# Patient Record
Sex: FEMALE | Race: WHITE | NOT HISPANIC OR LATINO | URBAN - METROPOLITAN AREA
[De-identification: names, ages, dates, MRNs, and addresses within clinical notes are randomized per-mention and may not be internally consistent; named-entity substitution may affect disease eponyms.]

---

## 2021-02-04 ENCOUNTER — INPATIENT (INPATIENT)
Facility: HOSPITAL | Age: 86
LOS: 11 days | Discharge: HOME | End: 2021-02-16
Attending: STUDENT IN AN ORGANIZED HEALTH CARE EDUCATION/TRAINING PROGRAM | Admitting: STUDENT IN AN ORGANIZED HEALTH CARE EDUCATION/TRAINING PROGRAM
Payer: MEDICARE

## 2021-02-04 VITALS
HEART RATE: 80 BPM | TEMPERATURE: 98 F | DIASTOLIC BLOOD PRESSURE: 61 MMHG | SYSTOLIC BLOOD PRESSURE: 119 MMHG | OXYGEN SATURATION: 97 % | RESPIRATION RATE: 16 BRPM

## 2021-02-04 DIAGNOSIS — Z98.891 HISTORY OF UTERINE SCAR FROM PREVIOUS SURGERY: Chronic | ICD-10-CM

## 2021-02-04 LAB
ALBUMIN SERPL ELPH-MCNC: 3.7 G/DL — SIGNIFICANT CHANGE UP (ref 3.3–5)
ALP SERPL-CCNC: 187 U/L — HIGH (ref 40–120)
ALT FLD-CCNC: 5 U/L — SIGNIFICANT CHANGE UP (ref 4–33)
ANION GAP SERPL CALC-SCNC: 13 MMOL/L — SIGNIFICANT CHANGE UP (ref 7–14)
AST SERPL-CCNC: 9 U/L — SIGNIFICANT CHANGE UP (ref 4–32)
BILIRUB SERPL-MCNC: 0.4 MG/DL — SIGNIFICANT CHANGE UP (ref 0.2–1.2)
BLOOD GAS VENOUS COMPREHENSIVE RESULT: SIGNIFICANT CHANGE UP
BUN SERPL-MCNC: 16 MG/DL — SIGNIFICANT CHANGE UP (ref 7–23)
CALCIUM SERPL-MCNC: 9.5 MG/DL — SIGNIFICANT CHANGE UP (ref 8.4–10.5)
CHLORIDE SERPL-SCNC: 104 MMOL/L — SIGNIFICANT CHANGE UP (ref 98–107)
CO2 SERPL-SCNC: 24 MMOL/L — SIGNIFICANT CHANGE UP (ref 22–31)
CREAT SERPL-MCNC: 1.07 MG/DL — SIGNIFICANT CHANGE UP (ref 0.5–1.3)
CRP SERPL-MCNC: 54.8 MG/L — HIGH
ERYTHROCYTE [SEDIMENTATION RATE] IN BLOOD: 29 MM/HR — HIGH (ref 4–25)
GLUCOSE SERPL-MCNC: 107 MG/DL — HIGH (ref 70–99)
HCT VFR BLD CALC: 39.1 % — SIGNIFICANT CHANGE UP (ref 34.5–45)
HGB BLD-MCNC: 12 G/DL — SIGNIFICANT CHANGE UP (ref 11.5–15.5)
IANC: 55.5 K/UL — HIGH (ref 1.5–8.5)
MCHC RBC-ENTMCNC: 27.9 PG — SIGNIFICANT CHANGE UP (ref 27–34)
MCHC RBC-ENTMCNC: 30.7 GM/DL — LOW (ref 32–36)
MCV RBC AUTO: 90.9 FL — SIGNIFICANT CHANGE UP (ref 80–100)
PLATELET # BLD AUTO: 246 K/UL — SIGNIFICANT CHANGE UP (ref 150–400)
POTASSIUM SERPL-MCNC: 3.8 MMOL/L — SIGNIFICANT CHANGE UP (ref 3.5–5.3)
POTASSIUM SERPL-SCNC: 3.8 MMOL/L — SIGNIFICANT CHANGE UP (ref 3.5–5.3)
PROT SERPL-MCNC: 6.1 G/DL — SIGNIFICANT CHANGE UP (ref 6–8.3)
RBC # BLD: 4.3 M/UL — SIGNIFICANT CHANGE UP (ref 3.8–5.2)
RBC # FLD: 13.5 % — SIGNIFICANT CHANGE UP (ref 10.3–14.5)
SODIUM SERPL-SCNC: 141 MMOL/L — SIGNIFICANT CHANGE UP (ref 135–145)
WBC # BLD: 110.56 K/UL — CRITICAL HIGH (ref 3.8–10.5)
WBC # FLD AUTO: 110.56 K/UL — CRITICAL HIGH (ref 3.8–10.5)

## 2021-02-04 PROCEDURE — 73110 X-RAY EXAM OF WRIST: CPT | Mod: 26,LT

## 2021-02-04 PROCEDURE — 73590 X-RAY EXAM OF LOWER LEG: CPT | Mod: 26,LT

## 2021-02-04 NOTE — ED ADULT NURSE NOTE - CHIEF COMPLAINT QUOTE
Pt brought in by EMS from home c/o increasing growth/necrosis on L. cheek. Pt with growth, necrosis on L. wrist and L. ankle. Denies fever, chills, difficulty breathing. Per EMS, growth appeared last summer after completing treatment for shingles. Per EMS, pt with unknown medical hx.

## 2021-02-04 NOTE — ED ADULT TRIAGE NOTE - CHIEF COMPLAINT QUOTE
Pt brought in by EMS from home c/o increasing growth on L. cheek. Pt with growth, necrosis on L. wrist and L. ankle. Denies fever, chills, difficulty breathing. Per EMS, growth appeared last summer after completing for shingles. Per EMS, pt with unknown medical hx. Pt brought in by EMS from home c/o increasing growth on L. cheek. Pt with growth, necrosis on L. wrist and L. ankle. Denies fever, chills, difficulty breathing. Per EMS, growth appeared last summer after completing treatment for shingles. Per EMS, pt with unknown medical hx. Pt brought in by EMS from home c/o increasing growth/necrosis on L. cheek. Pt with growth, necrosis on L. wrist and L. ankle. Denies fever, chills, difficulty breathing. Per EMS, growth appeared last summer after completing treatment for shingles. Per EMS, pt with unknown medical hx.

## 2021-02-04 NOTE — ED PROVIDER NOTE - CLINICAL SUMMARY MEDICAL DECISION MAKING FREE TEXT BOX
Vasu Dominguez MD. 89 F with no known PMHx presents for a fungating mass on L cheek for 3 years; similar lesion on L wrist. there is skin breakdown with dry gangrene on L shin as well. Pt states she presents today because "I'm desperate". Has not seen a doctor for these lesions. Exam as above. Pt unlikely to be able to care for herself at home, although she states she lives w/ her daughter. Will need CT to see extent of these masses, as well as a metastatic w/u. Will need admission.

## 2021-02-04 NOTE — ED PROVIDER NOTE - NS ED ROS FT
Constitutional: no fevers; no chills  HEENT: no visual changes, no sore throat, no rhinorrhea  CV: no cp; no palpitations  Resp: no sob; no cough  GI: no abd pain, no nausea, no vomiting, no diarrhea, no constipation  : no dysuria, no hematuria  MSK: no myalgais; no arthralgias  skin: Fungating mass   neuro: no HA, no numbness; no weakness, no tingling  ROS statement: all other ROS negative except as per HPI

## 2021-02-04 NOTE — ED PROVIDER NOTE - ATTENDING CONTRIBUTION TO CARE
agree with above hpi  on my exam  vital signs: T(C): 36.4 (02-04-21 @ 20:19), Max: 36.7 (02-04-21 @ 16:40)  HR: 78 (02-04-21 @ 20:19) (78 - 80)  BP: 124/54 (02-04-21 @ 20:19) (119/61 - 124/60)  BP(mean): --  RR: 15 (02-04-21 @ 20:19) (15 - 16)  SpO2: 98% (02-04-21 @ 20:19) (97% - 99%)  GEN - NAD; disheveled appearing, ao3  HEAD - NC/AT   EYES- PERRL, EOMI  ENT: Airway patent, mmm, no intraoral lesions or invasion. +large irregularly shaped fungating/necrotic mass to entirety of left cheek, malodorous, no bleeding, no surrounding induration/warmth/erythema. NECK: Neck supple, non-tender  PULMONARY - CTA b/l, symmetric breath sounds.   CARDIAC -s1s2, RRR, no M,G,R  ABDOMEN - +BS, ND, NT, soft, +suprapubic firm mass, nontender, no overlying skin change  BACK - no CVA tenderness, Normal  spine   EXTREMITIES - FROM to all joints. Left forearm with necrotic appearing mass, friable mucosa, left shin with necrotic skin flap, underlying friable and necrotic appearing tissue. No crepitus throughout.  Masses with no surrounding induration/erythema/warmth. No regions of fluctuance. No edema   SKIN - thin skin, mass findings as above  NEUROLOGIC - alert, speech clear, no focal deficits  PSYCH - calm, cooperative in ed.  Patient presents to the ed with very large fungating mass to left cheek which has been present and worsening for 3 years, mass to left forearm which has been present for months, and necrotic skin avulsion/wound to left shin, chronic abd mass. No fevers, chills, ha, cp, sob, abd pain, vomiting, diarrhea, dysuria. Patient states she has not seen a doctor in at least 15 years and is here for help. Patient can tolerate po without difficulty, facial mass does not appear to invade oropharyngeal mucosa. Appears unkempt. Plan for labs to eval for elec disturbances, organ dysfunction, ct face, c/a/p-further evaluate facial mass as well as evaluate for mets in setting of possible neoplastic cause, will require admission for further w/u. patient denies any previous medical history and does not take any meds at current time.

## 2021-02-04 NOTE — ED ADULT NURSE NOTE - OBJECTIVE STATEMENT
Patient is a 89-year-old, A&OX3, no known past medical Phx presenting to the ED for Left cheek, large necrotizing fungating mass. 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 Left shin noted to have dried gangrene. Pt says "I have not seen a doctor in many years". Protruding hernia noted to pt bottom. Pt unclear of timeline of growths. Pt appears comfortably appearing. Respirations even and unlabored, chest rise equal b/l. 20 G placed in R AC. Labs drawn and sent. Covid sent. Safety being maintained. Bed set in lowest position. Will continue to monitor.

## 2021-02-04 NOTE — ED PROVIDER NOTE - OBJECTIVE STATEMENT
89 yo F with no known pmhx presents to the ED for a left cheek fungating mass that pt states has been present for 3 years. states she has only spoken to a doctor over the phone and has not been evaluated in person for this. Pt also has a similar mass on her L wrist and necrotic L shin, unclear the timeline. Pt states she presents today because "i am desperate". pt denies dysphagia, fever, chills, nausea, vomiting, dysuria, cp, sob, abd pain, diarrhea. Pt states she lives with her daughter. 89 yo F with no known pmhx presents to the ED for a left cheek fungating mass that pt states has been present for 3 years. states she has only spoken to a doctor over the phone and has not been evaluated in person for this. Pt also has a similar mass on her L wrist and necrotic L shin, unclear the timeline. Pt states she presents today because "i am desperate". pt denies dysphagia, fever, chills, nausea, vomiting, dysuria, cp, sob, abd pain, diarrhea. Pt states she lives with her daughter.  Pt does not know her daughter's phone number and no number is listed in the EMR. 89 yo F with no known pmhx presents to the ED for a left cheek fungating mass that pt states has been present for 3 years. states she has only spoken to a doctor over the phone and has not been evaluated in person for this. Pt also has a similar mass on her L wrist and necrotic L shin, unclear the timeline. Pt states she presents today because "i am desperate". pt denies dysphagia, fever, chills, nausea, vomiting, dysuria, cp, sob, abd pain, diarrhea. Pt states she lives with her daughter.  Tania, daughter  Cell - [phone]  [place] - [phone]  Daughter states that she has had these lesions on her face and arm since the summer.

## 2021-02-04 NOTE — ED PROVIDER NOTE - PHYSICAL EXAMINATION
PHYSICAL EXAM:  GENERAL: non-toxic appearing; in no respiratory distress  HEAD Atraumatic, Normocephalic  NECK: No JVD; FROM  EYES: PERRL, EOMs intact b/l w/out deficits  CHEST/LUNG: CTAB no wheezes/rhonchi/rales  HEART: RRR no murmur/gallops/rubs  ABDOMEN: +BS, soft, NT; there is a large inguinal hernia with a mass in her groin region; no overlying skin changes  EXTREMITIES: No LE edema, +2 radial pulses b/l, +2 DP/PT pulses b/l  MUSCULOSKELETAL: FROM of all 4 extremities  NERVOUS SYSTEM:  A&Ox3, No motor deficits or sensory deficits; CNII-XII intact; no focal neurologic deficits  SKIN:  On left cheek, there is a large, necrotic fungating mass; it does NOT appear to invade in the oropharynx; there is a fungating mass on her L wrist; there is skin breakdown on her L shin; no crepitance;

## 2021-02-04 NOTE — ED ADULT NURSE REASSESSMENT NOTE - NS ED NURSE REASSESS COMMENT FT1
Received report from ISABEL Waddell. Pt is A&Ox4, resting comfortably. Breathing even and unlabored. NAD. Waiting for CT scan. Will continue to monitor.

## 2021-02-05 DIAGNOSIS — L98.8 OTHER SPECIFIED DISORDERS OF THE SKIN AND SUBCUTANEOUS TISSUE: ICD-10-CM

## 2021-02-05 DIAGNOSIS — R09.89 OTHER SPECIFIED SYMPTOMS AND SIGNS INVOLVING THE CIRCULATORY AND RESPIRATORY SYSTEMS: ICD-10-CM

## 2021-02-05 DIAGNOSIS — R82.71 BACTERIURIA: ICD-10-CM

## 2021-02-05 DIAGNOSIS — N81.4 UTEROVAGINAL PROLAPSE, UNSPECIFIED: ICD-10-CM

## 2021-02-05 DIAGNOSIS — R22.0 LOCALIZED SWELLING, MASS AND LUMP, HEAD: ICD-10-CM

## 2021-02-05 DIAGNOSIS — D72.829 ELEVATED WHITE BLOOD CELL COUNT, UNSPECIFIED: ICD-10-CM

## 2021-02-05 DIAGNOSIS — Z29.9 ENCOUNTER FOR PROPHYLACTIC MEASURES, UNSPECIFIED: ICD-10-CM

## 2021-02-05 LAB
APPEARANCE UR: ABNORMAL
APTT BLD: 26.8 SEC — LOW (ref 27–36.3)
APTT BLD: 28.7 SEC — SIGNIFICANT CHANGE UP (ref 27–36.3)
BACTERIA # UR AUTO: ABNORMAL
BASOPHILS # BLD AUTO: 0 K/UL — SIGNIFICANT CHANGE UP (ref 0–0.2)
BASOPHILS # BLD AUTO: 0.04 K/UL — SIGNIFICANT CHANGE UP (ref 0–0.2)
BASOPHILS NFR BLD AUTO: 0 % — SIGNIFICANT CHANGE UP (ref 0–2)
BASOPHILS NFR BLD AUTO: 0 % — SIGNIFICANT CHANGE UP (ref 0–2)
BILIRUB UR-MCNC: NEGATIVE — SIGNIFICANT CHANGE UP
BLD GP AB SCN SERPL QL: NEGATIVE — SIGNIFICANT CHANGE UP
COLOR SPEC: YELLOW — SIGNIFICANT CHANGE UP
D DIMER BLD IA.RAPID-MCNC: 159 NG/ML DDU — HIGH
DIFF PNL FLD: ABNORMAL
EOSINOPHIL # BLD AUTO: 0 K/UL — SIGNIFICANT CHANGE UP (ref 0–0.5)
EOSINOPHIL # BLD AUTO: 0.25 K/UL — SIGNIFICANT CHANGE UP (ref 0–0.5)
EOSINOPHIL NFR BLD AUTO: 0 % — SIGNIFICANT CHANGE UP (ref 0–6)
EOSINOPHIL NFR BLD AUTO: 0.3 % — SIGNIFICANT CHANGE UP (ref 0–6)
EPI CELLS # UR: 20 /HPF — HIGH (ref 0–5)
FERRITIN SERPL-MCNC: 292 NG/ML — HIGH (ref 15–150)
FIBRINOGEN PPP-MCNC: 714 MG/DL — HIGH (ref 290–520)
GLUCOSE UR QL: NEGATIVE — SIGNIFICANT CHANGE UP
HCT VFR BLD CALC: 33.4 % — LOW (ref 34.5–45)
HGB BLD-MCNC: 10.2 G/DL — LOW (ref 11.5–15.5)
HYALINE CASTS # UR AUTO: 3 /LPF — SIGNIFICANT CHANGE UP (ref 0–7)
IANC: 50.92 K/UL — HIGH (ref 1.5–8.5)
IMM GRANULOCYTES NFR BLD AUTO: 2.5 % — HIGH (ref 0–1.5)
INR BLD: 1.27 RATIO — HIGH (ref 0.88–1.16)
INR BLD: 1.32 RATIO — HIGH (ref 0.88–1.16)
KETONES UR-MCNC: NEGATIVE — SIGNIFICANT CHANGE UP
LDH SERPL L TO P-CCNC: 114 U/L — LOW (ref 135–225)
LEUKOCYTE ESTERASE UR-ACNC: ABNORMAL
LYMPHOCYTES # BLD AUTO: 44.5 % — HIGH (ref 13–44)
LYMPHOCYTES # BLD AUTO: 44.51 K/UL — HIGH (ref 1–3.3)
LYMPHOCYTES # BLD AUTO: 55 % — HIGH (ref 13–44)
LYMPHOCYTES # BLD AUTO: 60.81 K/UL — HIGH (ref 1–3.3)
MCHC RBC-ENTMCNC: 27.2 PG — SIGNIFICANT CHANGE UP (ref 27–34)
MCHC RBC-ENTMCNC: 30.5 GM/DL — LOW (ref 32–36)
MCV RBC AUTO: 89.1 FL — SIGNIFICANT CHANGE UP (ref 80–100)
MONOCYTES # BLD AUTO: 1.82 K/UL — HIGH (ref 0–0.9)
MONOCYTES # BLD AUTO: 2.21 K/UL — HIGH (ref 0–0.9)
MONOCYTES NFR BLD AUTO: 1.8 % — LOW (ref 2–14)
MONOCYTES NFR BLD AUTO: 2 % — SIGNIFICANT CHANGE UP (ref 2–14)
NEUTROPHILS # BLD AUTO: 47.54 K/UL — HIGH (ref 1.8–7.4)
NEUTROPHILS # BLD AUTO: 50.92 K/UL — HIGH (ref 1.8–7.4)
NEUTROPHILS NFR BLD AUTO: 43 % — SIGNIFICANT CHANGE UP (ref 43–77)
NEUTROPHILS NFR BLD AUTO: 50.9 % — SIGNIFICANT CHANGE UP (ref 43–77)
NITRITE UR-MCNC: NEGATIVE — SIGNIFICANT CHANGE UP
NRBC # BLD: 0 /100 WBCS — SIGNIFICANT CHANGE UP
NRBC # FLD: 0 K/UL — SIGNIFICANT CHANGE UP
PH UR: 6.5 — SIGNIFICANT CHANGE UP (ref 5–8)
PLATELET # BLD AUTO: 205 K/UL — SIGNIFICANT CHANGE UP (ref 150–400)
PROT UR-MCNC: ABNORMAL
PROTHROM AB SERPL-ACNC: 14.4 SEC — HIGH (ref 10.6–13.6)
PROTHROM AB SERPL-ACNC: 14.8 SEC — HIGH (ref 10.6–13.6)
RBC # BLD: 3.75 M/UL — LOW (ref 3.8–5.2)
RBC # FLD: 13.6 % — SIGNIFICANT CHANGE UP (ref 10.3–14.5)
RBC CASTS # UR COMP ASSIST: 2 /HPF — SIGNIFICANT CHANGE UP (ref 0–4)
RH IG SCN BLD-IMP: POSITIVE — SIGNIFICANT CHANGE UP
SARS-COV-2 RNA SPEC QL NAA+PROBE: SIGNIFICANT CHANGE UP
SP GR SPEC: 1.04 — HIGH (ref 1.01–1.02)
URATE SERPL-MCNC: 8.1 MG/DL — HIGH (ref 2.5–7)
URATE SERPL-MCNC: 8.3 MG/DL — HIGH (ref 2.5–7)
UROBILINOGEN FLD QL: SIGNIFICANT CHANGE UP
WBC # BLD: 100 K/UL — CRITICAL HIGH (ref 3.8–10.5)
WBC # FLD AUTO: 100 K/UL — CRITICAL HIGH (ref 3.8–10.5)
WBC UR QL: 185 /HPF — HIGH (ref 0–5)

## 2021-02-05 PROCEDURE — 99285 EMERGENCY DEPT VISIT HI MDM: CPT

## 2021-02-05 PROCEDURE — 99222 1ST HOSP IP/OBS MODERATE 55: CPT

## 2021-02-05 PROCEDURE — 99223 1ST HOSP IP/OBS HIGH 75: CPT | Mod: GC

## 2021-02-05 PROCEDURE — 88189 FLOWCYTOMETRY/READ 16 & >: CPT

## 2021-02-05 PROCEDURE — 71260 CT THORAX DX C+: CPT | Mod: 26

## 2021-02-05 PROCEDURE — 70450 CT HEAD/BRAIN W/O DYE: CPT | Mod: 26

## 2021-02-05 PROCEDURE — 99223 1ST HOSP IP/OBS HIGH 75: CPT | Mod: GC,AI

## 2021-02-05 PROCEDURE — 74177 CT ABD & PELVIS W/CONTRAST: CPT | Mod: 26

## 2021-02-05 PROCEDURE — 70487 CT MAXILLOFACIAL W/DYE: CPT | Mod: 26

## 2021-02-05 RX ORDER — SODIUM CHLORIDE 9 MG/ML
1000 INJECTION, SOLUTION INTRAVENOUS
Refills: 0 | Status: DISCONTINUED | OUTPATIENT
Start: 2021-02-05 | End: 2021-02-06

## 2021-02-05 RX ORDER — ENOXAPARIN SODIUM 100 MG/ML
40 INJECTION SUBCUTANEOUS DAILY
Refills: 0 | Status: DISCONTINUED | OUTPATIENT
Start: 2021-02-05 | End: 2021-02-05

## 2021-02-05 RX ORDER — SODIUM CHLORIDE 9 MG/ML
1000 INJECTION, SOLUTION INTRAVENOUS ONCE
Refills: 0 | Status: COMPLETED | OUTPATIENT
Start: 2021-02-05 | End: 2021-02-05

## 2021-02-05 RX ORDER — VANCOMYCIN HCL 1 G
1000 VIAL (EA) INTRAVENOUS ONCE
Refills: 0 | Status: COMPLETED | OUTPATIENT
Start: 2021-02-05 | End: 2021-02-05

## 2021-02-05 RX ORDER — ENOXAPARIN SODIUM 100 MG/ML
30 INJECTION SUBCUTANEOUS DAILY
Refills: 0 | Status: DISCONTINUED | OUTPATIENT
Start: 2021-02-05 | End: 2021-02-16

## 2021-02-05 RX ORDER — INFLUENZA VIRUS VACCINE 15; 15; 15; 15 UG/.5ML; UG/.5ML; UG/.5ML; UG/.5ML
0.5 SUSPENSION INTRAMUSCULAR ONCE
Refills: 0 | Status: DISCONTINUED | OUTPATIENT
Start: 2021-02-05 | End: 2021-02-16

## 2021-02-05 RX ORDER — PIPERACILLIN AND TAZOBACTAM 4; .5 G/20ML; G/20ML
3.38 INJECTION, POWDER, LYOPHILIZED, FOR SOLUTION INTRAVENOUS ONCE
Refills: 0 | Status: COMPLETED | OUTPATIENT
Start: 2021-02-05 | End: 2021-02-05

## 2021-02-05 RX ADMIN — SODIUM CHLORIDE 1000 MILLILITER(S): 9 INJECTION, SOLUTION INTRAVENOUS at 12:12

## 2021-02-05 RX ADMIN — Medication 100 MILLIGRAM(S): at 04:01

## 2021-02-05 RX ADMIN — Medication 250 MILLIGRAM(S): at 05:45

## 2021-02-05 RX ADMIN — PIPERACILLIN AND TAZOBACTAM 200 GRAM(S): 4; .5 INJECTION, POWDER, LYOPHILIZED, FOR SOLUTION INTRAVENOUS at 05:05

## 2021-02-05 RX ADMIN — ENOXAPARIN SODIUM 30 MILLIGRAM(S): 100 INJECTION SUBCUTANEOUS at 16:06

## 2021-02-05 NOTE — H&P ADULT - PROBLEM SELECTOR PLAN 2
WBC of 110.56 with lymphocytic predominance with elevated CRP, lactate  DDx malignancy (leukemia ie. CLL, lymphoma), less likely infectious  -CT A/P showing splenomegaly, LAD in left axilla   -LDH, uric acid added for TLS eval  -Peripheral Smear  -Coags, T/S  -LN biopsy for diagnosis and staging  -F/u BCx2, UCx  -GOC conversation regarding treatment options, code status  -Heme-Onc consulted

## 2021-02-05 NOTE — H&P ADULT - NSHPPHYSICALEXAM_GEN_ALL_CORE
PHYSICAL EXAM:  GENERAL: NAD, well-groomed, well-developed  HEAD: +Large fungating malodorous mass draining brown serous fluid on localized to left cheek, otherwise normocephalic  EYES: EOMI, PERRLA, conjunctiva and sclera clear  ENMT: No tonsillar erythema, exudates, or enlargement; Moist mucous membranes  NECK: +Erythema of left neck. Supple, No JVD, Normal Thyroid  CHEST/LUNG: Decreased lung sounds on left lower lobe, otherwise clear to auscultation bilaterally; No rales, rhonchi, wheezing, or rubs  HEART: Regular rate and rhythm; S1 and S2; No murmurs, rubs, or gallops  ABDOMEN: +Splenomegaly. Soft, Nontender, Nondistended: Bowel sounds present  EXTREMITIES: 2+ Peripheral Pulses, No clubbing, cyanosis, or edema  LYMPH: +Left axillary LAD  SKIN: +Left shin necrotic lesion, left wrist mass with bandaging c/d/i  MSK: no joint swelling or erythema  NEURO: CN 2-12 intact, 5/5 strength in UE and LE, gross sensation intact, Gait not assessed  PSYCH: AOX4 Vital Signs Last 24 Hrs  T(C): 36.7 (05 Feb 2021 07:00), Max: 36.8 (05 Feb 2021 01:02)  T(F): 98.1 (05 Feb 2021 07:00), Max: 98.3 (05 Feb 2021 01:02)  HR: 75 (05 Feb 2021 07:00) (75 - 80)  BP: 126/68 (05 Feb 2021 07:00) (109/50 - 139/59)  BP(mean): --  RR: 16 (05 Feb 2021 07:00) (15 - 16)  SpO2: 98% (05 Feb 2021 07:00) (97% - 99%)    PHYSICAL EXAM:  GENERAL: NAD, well-groomed, well-developed  HEAD: +Large fungating malodorous soft tisse mass draining brown serous fluid on localized to left cheek, doesn't cross midline, +non-tender, otherwise normocephalic and normal facies  EYES: EOMI, PERRLA, conjunctiva and sclera clear  ENMT: No tonsillar erythema, exudates, or enlargement; Moist mucous membranes  NECK: +Erythema of left neck. Supple, No JVD, Normal Thyroid  CHEST/LUNG: Decreased lung sounds on left lower lobe, otherwise clear to auscultation bilaterally; No rales, rhonchi, wheezing, or rubs  HEART: Regular rate and rhythm; S1 and S2; No murmurs, rubs, or gallops  ABDOMEN: +Splenomegaly. Soft, Nontender, Nondistended: Bowel sounds present  EXTREMITIES: 2+ Peripheral Pulses, No clubbing, cyanosis, or edema  LYMPH: +Left axillary LAD  SKIN: +Left shin necrotic lesion, left wrist mass with bandaging c/d/i  MSK: no joint swelling or erythema  NEURO: CN 2-12 intact, 5/5 strength in UE and LE, gross sensation intact, Gait not assessed  PSYCH: AOX4

## 2021-02-05 NOTE — H&P ADULT - NSHPLABSRESULTS_GEN_ALL_CORE
CBC Full  -  ( 04 Feb 2021 20:40 )  WBC Count : 110.56 K/uL  RBC Count : 4.30 M/uL  Hemoglobin : 12.0 g/dL  Hematocrit : 39.1 %  Platelet Count - Automated : 246 K/uL  Mean Cell Volume : 90.9 fL  Mean Cell Hemoglobin : 27.9 pg  Mean Cell Hemoglobin Concentration : 30.7 gm/dL  Auto Neutrophil # : 47.54 K/uL  Auto Lymphocyte # : 60.81 K/uL  Auto Monocyte # : 2.21 K/uL  Auto Eosinophil # : 0.00 K/uL  Auto Basophil # : 0.00 K/uL  Auto Neutrophil % : 43.0 %  Auto Lymphocyte % : 55.0 %  Auto Monocyte % : 2.0 %  Auto Eosinophil % : 0.0 %  Auto Basophil % : 0.0 %    02-04    141  |  104  |  16  ----------------------------<  107<H>  3.8   |  24  |  1.07    Ca    9.5      04 Feb 2021 20:40    TPro  6.1  /  Alb  3.7  /  TBili  0.4  /  DBili  x   /  AST  9   /  ALT  5   /  AlkPhos  187<H>  02-04    Urinalysis + Microscopic Examination (02.05.21 @ 05:23)   Urine Appearance: Slightly Turbid   Urobilinogen: <2 mg/dL   Specific Gravity: 1.041   Protein, Urine: 30 mg/dL   pH Urine: 6.5   Leukocyte Esterase Concentration: Large   Nitrite: Negative   Ketone - Urine: Negative   Bilirubin: Negative   Color: Yellow   Glucose Qualitative, Urine: Negative   Blood, Urine: Small   Red Blood Cell - Urine: 2 /HPF   White Blood Cell - Urine: 185 /HPF   Epithelial Cells: 20 /HPF   Hyaline Casts: 3 /LPF     Bacteria: Moderate Blood Gas Venous - Lactate: 2.3    Blood Gas Profile - Venous (02.04.21 @ 20:40)   pH, Venous: 7.40   pCO2, Venous: 41 mmHg   pO2, Venous: 24 mmHg   HCO3, Venous: 24 mmol/L   Base Excess, Venous: 0.6 mmol/L   Oxygen Saturation, Venous: 33.3 %     C-Reactive Protein, Serum: 54.8 mg/L   Sedimentation Rate, Erythrocyte: 29 mm/hr (02.04.21 @ 20:40)     CT Chest w/ IV Cont (02.05.21 @ 01:07)    Nonspecific subcentimeter left lower cervical lymph nodes. Nonspecific left axillary lymph nodes.  Moderate hiatal hernia.  Mild interstitial pulmonary edema. Small bilateral pleural effusions, left greater than right.  Splenomegaly.    Prolapse of the bladder and the uterus into the perineum.  Right inguinal hernia containing nonobstructed small bowel. Left inguinal hernia containing nonobstructed colon.    Additional incidental findings as described above.      CT HEAD: No intracranial hemorrhage, edema or mass effect    CT MAXILLOFACIAL BONES: Extensive left face heterogenous enhancing soft tissue mass containing droplets of gas. No associated bony destruction.    Xray Tibia + Fibula 2 Views, Left: Heterogenous soft tissue mass of the distal left calf.    Xray Wrist 3 Views, Left: Soft tissue heterogenous mass over the lateral aspect of the distal wrist.

## 2021-02-05 NOTE — CONSULT NOTE ADULT - ATTENDING COMMENTS
Patient seen and examined, agree with above. 89 y/o presenting for evaluation of large (9x7cm) fungating mass arising from left cheek, consulted for leukocytosis. 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 She has another mass on left arm. Masses need biopsy as patients with CLL at risk for secondary malignancies. We will send flow cytometry to confirm high suspicion for CLL. POC as above and obtain SPEP, RIVKA, immunoglobulins.    Siomara Maya MD, MS  [phone]

## 2021-02-05 NOTE — H&P ADULT - NSHPREVIEWOFSYSTEMS_GEN_ALL_CORE
REVIEW OF SYSTEMS:    CONSTITUTIONAL: No weakness, fevers, night sweats, fatigue/malaise, chills, weight loss, loss of appetite  HEENT: No headache, visual changes, hearing changes, dysphagia or odynophagia    NECK: No neck pain or stiffness  RESPIRATORY: No SOB, cough, wheezing, hemoptysis   CARDIOVASCULAR: No chest pain, palpitations, orthopnea  GASTROINTESTINAL: No abdominal pain, nausea, vomiting, hematemesis, diarrhea or constipation. No melena or hematochezia.  GENITOURINARY: +Increased urinary frequency. No dysuria or hematuria.   NEUROLOGICAL: No numbness, weakness, paresthesias  MSK: No joint swelling or pain  HEME: +Discharge from left facial fungating lesion. Denies easy bruising or bleeding.  SKIN: +Left shin lesion, left wrist lesion   PSYCH: No mood changes, no SI/HI

## 2021-02-05 NOTE — H&P ADULT - HISTORY OF PRESENT ILLNESS
Patient is a 89 y F with no PMHx, not seen by a physician for over 10 years, who presents for increasing growth of a fungating necrotic mass of the left face for the past 3 months. Patient is a poor historian with limited health care literacy. Patient says mass of the right face, soft tissue mass of left wrist and necrotic left shin and has been present for 2-3 years, was initially small (face and wrist lesion: quarter size) and has rapidly grown in size in the last 3 months. Denies bleeding, purulent discharge, pain. Denies dysphagia, odynophagia, difficulty breathing. Denies fevers, N/V, weight loss, chest pain, palpitations. In the ED, received clindamycin, zosyn and vancomycin.

## 2021-02-05 NOTE — CONSULT NOTE ADULT - ATTENDING COMMENTS
Agree with above  Pt is declining Gyn exam and Gyn intervention at this time.  However as pt with no current urinary complaints and as her prolapse appears to be a chronic issue, pt can F/U as outpt with Gyn.    ALEJO Mccabe

## 2021-02-05 NOTE — H&P ADULT - ASSESSMENT
Patient is a 89 y F with no PMHx, not seen by a physician for over 20 years, who presents for increasing growth of a fungating necrotic mass of the left face for the past 3 months.  Patient is a 89 y F with no PMHx, not seen by a physician for over 10 years, who presents for increasing growth of a fungating necrotic mass of the left face for the past 3 months.

## 2021-02-05 NOTE — H&P ADULT - PROBLEM SELECTOR PLAN 5
Notable uterine and bladder prolapse on exam and CT Scan.  -UA positive for bacteria and LE, urinary frequency  -GYN consult

## 2021-02-05 NOTE — H&P ADULT - ATTENDING COMMENTS
89F with fungating mass to L face, shin, arm c/b leukocytosis, possible uterine prolapse.  High suspicion for malignancy.  Patient had expectations to have facial mass resected - will consult plastic surgery, however it's unlikely they'll be able to do resection due to extensive nature. Heme consult for leukocytosis.

## 2021-02-05 NOTE — H&P ADULT - PROBLEM SELECTOR PLAN 1
-Large fungating necrotic soft tissue mass on left face containing droplets of gas.  -CT HEAD showing no intracranial hemorrhage, edema or mass effect  -CT MAXILLOFACIAL BONES showing no associated bony destruction  -DDx malignancy (leukemia, T Cell lymphoma, mycosis fungoides, etc), infectious  -Denies exposure, recent trauma to the face  -Surgery Consult placed for resection evaluation

## 2021-02-05 NOTE — CONSULT NOTE ADULT - SUBJECTIVE AND OBJECTIVE BOX
Gyn Consult Note  SHAUNA RAYGOZA  89y  Female [id number]    89y/o F without significant PMHx (reportedly not seen by a physician in 10y) admitted to medicine service with a fungating necrotic mass of the left face which has been increasing in size x3 months.     GYN consulted for incidental finding of uterine prolapse on CT and physical exam by primary team. However, upon attempt to illicit thorough history and perform GYN exam, patient adamantly refused gynecology care stating: "Gynecology? They want to get gynecology involved now? No way. I'm here for one reason, one reason only."    Patient denied any knowledge of uterine prolapse or a bulge in the vaginal region. She denied pelvic pressure or pain. Denied difficulty urinating.      GYNHx: Denies GYN hx     PAST MEDICAL & SURGICAL HISTORY:  Facial mass  S/P     Allergies: NKA    Vital Signs Last 24 Hrs  T(C): 36.7 (2021 07:00), Max: 36.8 (2021 01:02)  T(F): 98.1 (2021 07:00), Max: 98.3 (2021 01:02)  HR: 75 (2021 07:00) (75 - 80)  BP: 126/68 (2021 07:00) (109/50 - 139/59)  RR: 16 (2021 07:00) (15 - 16)  SpO2: 98% (2021 07:00) (97% - 99%)    Physical Exam:   General: Sitting comfortably in bed, NAD, eating lunch   Neuro: A&Ox3   Pulm: No increased work of breathing   Skin: Large, fungating mass replacing left cheek  : Declined by patient     LABS:             12.0   110.56 )-----------( 246      ( 2021 20:40 )             39.1     02-    141  |  104  |  16  ----------------------------<  107<H>  3.8   |  24  |  1.07    Ca    9.5      2021 20:40    TPro  6.1  /  Alb  3.7  /  TBili  0.4  /  DBili  x   /  AST  9   /  ALT  5   /  AlkPhos  187<H>  02-04    PT/INR - ( 2021 12:01 )   PT: 14.4 sec;   INR: 1.27 ratio     PTT - ( 2021 12:01 )  PTT:28.7 sec    Urinalysis Basic - ( 2021 05:23 )  Color: Yellow / Appearance: Slightly Turbid / S.041 / pH: x  Gluc: x / Ketone: Negative  / Bili: Negative / Urobili: <2 mg/dL   Blood: x / Protein: 30 mg/dL / Nitrite: Negative   Leuk Esterase: Large / RBC: 2 /HPF /  /HPF   Sq Epi: x / Non Sq Epi: 20 /HPF / Bacteria: Moderate    RADIOLOGY & ADDITIONAL STUDIES:  < from: CT Abdomen and Pelvis w/ IV Cont (21 @ 01:06) >  EXAM:  CT ABDOMEN AND PELVIS IC      EXAM:  CT CHEST IC    PROCEDURE DATE:  2021   INTERPRETATION:  CT of the chest, abdomen and pelvis  Indication: Fungating mass on the left cheek, evaluate for metastasis  Technique: Axial images were obtained from the thoracic inlet through pubic symphysis with IV contrast.  90 cc of Omnipaque 350 was administered intravenously without complication and 10 cc was discarded.  Reformatted coronal and sagittal images were submitted.  Comparison: None    FINDINGS:  There are nonspecific subcentimeter left lower cervical lymph nodes. There is a 1 x 1.5 cm left axillary lymph node.  The tracheobronchial tree is patent centrally.  There is no mediastinal hematoma.  The great vessels are not enlarged. Aberrant right subclavian artery. Coronary atherosclerosis. There is no significant mediastinal or hilar adenopathy.  The heart is not enlarged.  There is no pericardial effusion.  Lungs: Scattered groundglass opacities with septalthickening and pulmonary vascular enlargement compatible with interstitial pulmonary edema. Bibasilar streaky atelectasis.  Pleura: Small bilateral pleural effusions, mild on the left and trace on the right.  There is no free air or focal collection.  No free fluid.  Liver: Normal.  Spleen: The spleen is enlarged, measuring 14.8 x 7.3 x 17.5 cm.  Gallbladder: Unremarkable.  Biliary tree: Unremarkable.  Adrenal glands: Normal.  Pancreas: Normal.  Kidneys: Unremarkable.  Bowel: Moderate hiatal hernia. There is no small bowel obstruction. Appendix is unremarkable.  The colon is underdistended. Diffuse colonic diverticulosis. Left inguinal hernia containing nonobstructed sigmoid colon.  Bladder: Prolapsed with a 6 mm stone in the perineum.  Pelvic organs: Prolapse of the bladder and uterus into the perineum.  There is no significant adenopathy.  Vasculature: The aorta is not dilated. Moderate atherosclerotic vascular calcification.  Retroperitoneum: There is no mass.  Bones: Chronic degenerative changes of the spine. Age-indeterminate mild compression deformity of the superior endplate of L3 without retropulsion into the spinal canal.  Subcutaneous tissues: Right inguinal hernia containing nonobstructed small bowel. Left inguinal hernia containing nonobstructed sigmoid colon.    IMPRESSION:  Nonspecific subcentimeter left lower cervical lymph nodes. Nonspecific left axillary lymph nodes.  Moderate hiatal hernia.  Mild interstitial pulmonary edema. Small bilateral pleuraleffusions, left greater than right.  Splenomegaly.  Prolapse of the bladder and the uterus into the perineum.  Right inguinal hernia containing nonobstructed small bowel. Left inguinal hernia containing nonobstructed colon.  Additional incidentalfindings as described above.    LARS BARNEY MD; Attending Radiologist  This document has been electronically signed. 2021  2:44AM

## 2021-02-05 NOTE — CONSULT NOTE ADULT - ASSESSMENT
Ms. Springer is a 89 Year-Old Lady presenting with fungating mass of L face.     - Had extensive discussion with patient regarding options pending workup including likely non-curative surgery for resection of mass on face, possible chemo/radiation, vs no intervention, she states that she would like some time to think about things.   - Continue workup as deemed acceptable by patient, will require tissue diagnosis.   - Appreciate Heme/Onc recs.   - To be discussed with Attending.     D Team Surgical Oncology Pager #[phone]            Ms. Springer is a 89 Year-Old Lady presenting with fungating mass of L face.     - Had extensive discussion with patient regarding options pending workup including likely non-curative surgery for resection of mass on face, possible chemo/radiation, vs no intervention, she states that she would like some time to think about things.   - Continue workup as deemed acceptable by patient, will require tissue diagnosis.   - Appreciate Heme/Onc recs.   - To be discussed with Attending.     D Team Surgical Oncology Pager #[phone]

## 2021-02-05 NOTE — H&P ADULT - PROBLEM SELECTOR PLAN 3
Left necrotic wound on left shin with soft tissue wound on left wrist  -XR confirming heternogenous soft tissue masses of both  -Wound care consult Left necrotic wound on left shin with soft tissue wound on left wrist  -XR confirming heterogenous soft tissue masses of both  -Wound care consult

## 2021-02-05 NOTE — PROVIDER CONTACT NOTE (CRITICAL VALUE NOTIFICATION) - BACKGROUND
Patient is a 89 year old patient Patient is a 89 year old female admitted with a localized swelling/mass and lump of head.

## 2021-02-05 NOTE — PATIENT PROFILE ADULT - FALL HARM RISK
GENERALIZED WEAKNESS/age(85 years old or older)/bones(Osteoporosis,prev fx,steroid use,metastatic bone ca)/other

## 2021-02-05 NOTE — CONSULT NOTE ADULT - SUBJECTIVE AND OBJECTIVE BOX
[facility] Surgical Oncology Consultant Evaluation    HPI:  Patient is a 89 y F with no PMHx, not seen by a physician for over 10 years, who presents for increasing growth of a fungating necrotic mass of the left face for the past 3 months. Patient is a poor historian with limited health care literacy. Patient says mass of the right face, soft tissue mass of left wrist and necrotic left shin and has been present for 2-3 years, was initially small (face and wrist lesion: quarter size) and has rapidly grown in size in the last 3 months. Denies bleeding, purulent discharge, pain. Denies dysphagia, odynophagia, difficulty breathing. Denies fevers, N/V, weight loss, chest pain, palpitations. In the ED, received clindamycin, zosyn and vancomycin.     Surgical Oncology consulted for evaluation of resection of facial mass. Patient states that mass has been present for 3 years however worsening over the last few months. Also has mass on L arm. Unsure whether or not she wants surgery but wishing something to be done.       PAST MEDICAL & SURGICAL HISTORY:  Facial mass    S/P         MEDICATIONS  (STANDING):  enoxaparin Injectable 30 milliGRAM(s) SubCutaneous daily  influenza   Vaccine 0.5 milliLiter(s) IntraMuscular once      ___________________________________________  REVIEW OF SYSTEMS:  As stated in History of Present Illness, otherwise non-contributory.   ___________________________________________  PHYSICAL EXAM:  Vital Signs Last 24 Hrs  T(C): 36.4 (2021 13:00), Max: 36.8 (2021 01:02)  T(F): 97.5 (2021 13:00), Max: 98.3 (2021 01:02)  HR: 66 (2021 13:00) (66 - 80)  BP: 112/50 (2021 13:00) (109/50 - 139/59)  BP(mean): --  RR: 17 (2021 13:00) (15 - 17)  SpO2: 96% (2021 13:00) (96% - 99%)CAPILLARY BLOOD GLUCOSE        I&O's Detail      General: No acute distress.  HEENT: Large, malodorous mass of L face.  Respiratory: Breathing non-labored.  Abdomen: Soft, nontender, nondistended. No rebound, no guarding, No palpable organomegaly or masses.  Extremities: Left distal arm mass.   ____________________________________________  LABS:  CBC Full  -  ( 2021 20:40 )  WBC Count : 110.56 K/uL  RBC Count : 4.30 M/uL  Hemoglobin : 12.0 g/dL  Hematocrit : 39.1 %  Platelet Count - Automated : 246 K/uL  Mean Cell Volume : 90.9 fL  Mean Cell Hemoglobin : 27.9 pg  Mean Cell Hemoglobin Concentration : 30.7 gm/dL  Auto Neutrophil # : 47.54 K/uL  Auto Lymphocyte # : 60.81 K/uL  Auto Monocyte # : 2.21 K/uL  Auto Eosinophil # : 0.00 K/uL  Auto Basophil # : 0.00 K/uL  Auto Neutrophil % : 43.0 %  Auto Lymphocyte % : 55.0 %  Auto Monocyte % : 2.0 %  Auto Eosinophil % : 0.0 %  Auto Basophil % : 0.0 %        141  |  104  |  16  ----------------------------<  107<H>  3.8   |  24  |  1.07    Ca    9.5      2021 20:40    TPro  6.1  /  Alb  3.7  /  TBili  0.4  /  DBili  x   /  AST  9   /  ALT  5   /  AlkPhos  187<H>      LIVER FUNCTIONS - ( 2021 20:40 )  Alb: 3.7 g/dL / Pro: 6.1 g/dL / ALK PHOS: 187 U/L / ALT: 5 U/L / AST: 9 U/L / GGT: x           PT/INR - ( 2021 12:01 )   PT: 14.4 sec;   INR: 1.27 ratio         PTT - ( 2021 12:01 )  PTT:28.7 sec  Urinalysis Basic - ( 2021 05:23 )    Color: Yellow / Appearance: Slightly Turbid / S.041 / pH: x  Gluc: x / Ketone: Negative  / Bili: Negative / Urobili: <2 mg/dL   Blood: x / Protein: 30 mg/dL / Nitrite: Negative   Leuk Esterase: Large / RBC: 2 /HPF /  /HPF   Sq Epi: x / Non Sq Epi: 20 /HPF / Bacteria: Moderate          ____________________________________________  RADIOLOGY:  R< from: CT Head No Cont (21 @ 01:05) >  FINDINGS:    CT HEAD:    No CT evidence of acute intracranial hemorrhage, extra-axial collection, edema, mass effect, midline shift, central herniation, or hydrocephalus. Grey-white matter junction is well-preserved.    Age-related prominence of sulci and ventricles. Patchy white matter hypoattenuation which is nonspecific in etiology but likely related to chronic microvascular ischemic disease.    Left maxillary sinus mild mucosal thickening and left nasal turbinate retention cyst versus polyp.. Bilateral mastoid air cells and middle ear cavities are clear.    Calvarium is intact. Left facial soft tissue mass, partially visualized and described below in maxillofacial section.    CT MAXILLOFACIAL BONES:    Extensive left mid and lower face heterogenous, enhancing soft tissue mass containing droplets of gas measuring approximately 7.1 x 4.6 x 9.3 cm (8:141 and 801:25). Cortical integrity of the adjacent frontal zygomatic process and maxilla is preserved. Deep spaces of the face are preserved.    The patient is edentulous.  No bony destruction.    Submandibular, parotid and sublingual glands are symmetric in size and shape bilaterally. Epiglottic and aryepiglottic folds are not thickened. Oropharyngeal tract is normal in course and caliber.    No cervical lymphadenopathy.    Left maxillary sinus mucosal thickening and left nasal turbinate retention cyst versus polyp.    The globes aresymmetric in size and contour. Extraocular muscles are not enlarged or deviated. No radiopaque orbital foreign bodies are visualized. The retrobulbar fat is preserved.    IMPRESSION:    CT HEAD: No intracranial hemorrhage, edema or mass effect    CT MAXILLOFACIAL BONES: Extensive left face heterogenous enhancing soft tissue mass containing droplets of gas. No associated bony destruction.      r< from: CT Abdomen and Pelvis w/ IV Cont (21 @ 01:06) >  FINDINGS:    There are nonspecific subcentimeter left lower cervical lymph nodes. There is a 1 x 1.5 cm left axillary lymph node.    The tracheobronchial tree is patent centrally.  There is no mediastinal hematoma.  The great vessels are not enlarged. Aberrant right subclavian artery. Coronary atherosclerosis. There is no significant mediastinal or hilar adenopathy.    The heart is not enlarged.  There is no pericardial effusion.    Lungs: Scattered groundglass opacities with septalthickening and pulmonary vascular enlargement compatible with interstitial pulmonary edema. Bibasilar streaky atelectasis.    Pleura: Small bilateral pleural effusions, mild on the left and trace on the right.  There is no free air or focal collection.  No free fluid.    Liver: Normal.  Spleen: The spleen is enlarged, measuring 14.8 x 7.3 x 17.5 cm.  Gallbladder: Unremarkable.  Biliary tree: Unremarkable.  Adrenal glands: Normal.  Pancreas: Normal.  Kidneys: Unremarkable.    Bowel: Moderate hiatal hernia. There is no small bowel obstruction. Appendix is unremarkable.  The colon is underdistended. Diffuse colonic diverticulosis. Left inguinal hernia containing nonobstructed sigmoid colon.    Bladder: Prolapsed with a 6 mm stone in the perineum.  Pelvic organs: Prolapse of the bladder and uterus into the perineum.    There is no significant adenopathy.  Vasculature: The aorta is not dilated. Moderate atherosclerotic vascular calcification.    Retroperitoneum: There is no mass.  Bones: Chronic degenerative changes of the spine. Age-indeterminate mild compression deformity of the superior endplate of L3 without retropulsion into the spinal canal.  Subcutaneous tissues: Right inguinal hernia containing nonobstructed small bowel. Left inguinal hernia containing nonobstructed sigmoid colon.    IMPRESSION:    Nonspecific subcentimeter left lower cervical lymph nodes. Nonspecific left axillary lymph nodes.  Moderate hiatal hernia.  Mild interstitial pulmonary edema. Small bilateral pleuraleffusions, left greater than right.  Splenomegaly.    Prolapse of the bladder and the uterus into the perineum.  Right inguinal hernia containing nonobstructed small bowel. Left inguinal hernia containing nonobstructed colon.    Additional incidentalfindings as described above.    < from: Xray Wrist 3 Views, Left (21 @ 20:51) >    IMPRESSION:    Soft tissue heterogenous mass over the lateral aspect of the distal wrist.     [facility] Surgical Oncology Consultant Evaluation    HPI:  Patient is a 89 y F with no PMHx, not seen by a physician for over 10 years, who presents for increasing growth of a fungating necrotic mass of the left face for the past 3 months. Patient is a poor historian with limited health care literacy. Patient says mass of the right face, soft tissue mass of left wrist and necrotic left shin and has been present for 2-3 years, was initially small (face and wrist lesion: quarter size) and has rapidly grown in size in the last 3 months. Denies bleeding, purulent discharge, pain. Denies dysphagia, odynophagia, difficulty breathing. Denies fevers, N/V, weight loss, chest pain, palpitations. In the ED, received clindamycin, zosyn and vancomycin.     Surgical Oncology consulted for evaluation of resection of facial mass. Patient states that mass has been present for 3 years however worsening over the last few months. Also has mass on L arm. Unsure whether or not she wants surgery but wishing something to be done.       PAST MEDICAL & SURGICAL HISTORY:  Facial mass    S/P         MEDICATIONS  (STANDING):  enoxaparin Injectable 30 milliGRAM(s) SubCutaneous daily  influenza   Vaccine 0.5 milliLiter(s) IntraMuscular once      ___________________________________________  REVIEW OF SYSTEMS:  As stated in History of Present Illness, otherwise non-contributory.   ___________________________________________  PHYSICAL EXAM:  Vital Signs Last 24 Hrs  T(C): 36.4 (2021 13:00), Max: 36.8 (2021 01:02)  T(F): 97.5 (2021 13:00), Max: 98.3 (2021 01:02)  HR: 66 (2021 13:00) (66 - 80)  BP: 112/50 (2021 13:00) (109/50 - 139/59)  BP(mean): --  RR: 17 (2021 13:00) (15 - 17)  SpO2: 96% (2021 13:00) (96% - 99%)CAPILLARY BLOOD GLUCOSE        I&O's Detail      General: No acute distress.  HEENT: Large, malodorous mass of L face.  Respiratory: Breathing non-labored.  Abdomen: Soft, nontender, nondistended. No rebound, no guarding, No palpable organomegaly or masses.  Extremities: Left distal arm mass.   ____________________________________________  LABS:  CBC Full  -  ( 2021 20:40 )  WBC Count : 110.56 K/uL  RBC Count : 4.30 M/uL  Hemoglobin : 12.0 g/dL  Hematocrit : 39.1 %  Platelet Count - Automated : 246 K/uL  Mean Cell Volume : 90.9 fL  Mean Cell Hemoglobin : 27.9 pg  Mean Cell Hemoglobin Concentration : 30.7 gm/dL  Auto Neutrophil # : 47.54 K/uL  Auto Lymphocyte # : 60.81 K/uL  Auto Monocyte # : 2.21 K/uL  Auto Eosinophil # : 0.00 K/uL  Auto Basophil # : 0.00 K/uL  Auto Neutrophil % : 43.0 %  Auto Lymphocyte % : 55.0 %  Auto Monocyte % : 2.0 %  Auto Eosinophil % : 0.0 %  Auto Basophil % : 0.0 %        141  |  104  |  16  ----------------------------<  107<H>  3.8   |  24  |  1.07    Ca    9.5      2021 20:40    TPro  6.1  /  Alb  3.7  /  TBili  0.4  /  DBili  x   /  AST  9   /  ALT  5   /  AlkPhos  187<H>      LIVER FUNCTIONS - ( 2021 20:40 )  Alb: 3.7 g/dL / Pro: 6.1 g/dL / ALK PHOS: 187 U/L / ALT: 5 U/L / AST: 9 U/L / GGT: x           PT/INR - ( 2021 12:01 )   PT: 14.4 sec;   INR: 1.27 ratio         PTT - ( 2021 12:01 )  PTT:28.7 sec  Urinalysis Basic - ( 2021 05:23 )    Color: Yellow / Appearance: Slightly Turbid / S.041 / pH: x  Gluc: x / Ketone: Negative  / Bili: Negative / Urobili: <2 mg/dL   Blood: x / Protein: 30 mg/dL / Nitrite: Negative   Leuk Esterase: Large / RBC: 2 /HPF /  /HPF   Sq Epi: x / Non Sq Epi: 20 /HPF / Bacteria: Moderate          ____________________________________________  RADIOLOGY:  R< from: CT Head No Cont (21 @ 01:05) >  FINDINGS:    CT HEAD:    No CT evidence of acute intracranial hemorrhage, extra-axial collection, edema, mass effect, midline shift, central herniation, or hydrocephalus. Grey-white matter junction is well-preserved.    Age-related prominence of sulci and ventricles. Patchy white matter hypoattenuation which is nonspecific in etiology but likely related to chronic microvascular ischemic disease.    Left maxillary sinus mild mucosal thickening and left nasal turbinate retention cyst versus polyp.. Bilateral mastoid air cells and middle ear cavities are clear.    Calvarium is intact. Left facial soft tissue mass, partially visualized and described below in maxillofacial section.    CT MAXILLOFACIAL BONES:    Extensive left mid and lower face heterogenous, enhancing soft tissue mass containing droplets of gas measuring approximately 7.1 x 4.6 x 9.3 cm (8:141 and 801:25). Cortical integrity of the adjacent frontal zygomatic process and maxilla is preserved. Deep spaces of the face are preserved.    The patient is edentulous.  No bony destruction.    Submandibular, parotid and sublingual glands are symmetric in size and shape bilaterally. Epiglottic and aryepiglottic folds are not thickened. Oropharyngeal tract is normal in course and caliber.    No cervical lymphadenopathy.    Left maxillary sinus mucosal thickening and left nasal turbinate retention cyst versus polyp.    The globes aresymmetric in size and contour. Extraocular muscles are not enlarged or deviated. No radiopaque orbital foreign bodies are visualized. The retrobulbar fat is preserved.    IMPRESSION:    CT HEAD: No intracranial hemorrhage, edema or mass effect    CT MAXILLOFACIAL BONES: Extensive left face heterogenous enhancing soft tissue mass containing droplets of gas. No associated bony destruction.      r< from: CT Abdomen and Pelvis w/ IV Cont (21 @ 01:06) >  FINDINGS:    There are nonspecific subcentimeter left lower cervical lymph nodes. There is a 1 x 1.5 cm left axillary lymph node.    The tracheobronchial tree is patent centrally.  There is no mediastinal hematoma.  The great vessels are not enlarged. Aberrant right subclavian artery. Coronary atherosclerosis. There is no significant mediastinal or hilar adenopathy.    The heart is not enlarged.  There is no pericardial effusion.    Lungs: Scattered groundglass opacities with septalthickening and pulmonary vascular enlargement compatible with interstitial pulmonary edema. Bibasilar streaky atelectasis.    Pleura: Small bilateral pleural effusions, mild on the left and trace on the right.  There is no free air or focal collection.  No free fluid.    Liver: Normal.  Spleen: The spleen is enlarged, measuring 14.8 x 7.3 x 17.5 cm.  Gallbladder: Unremarkable.  Biliary tree: Unremarkable.  Adrenal glands: Normal.  Pancreas: Normal.  Kidneys: Unremarkable.    Bowel: Moderate hiatal hernia. There is no small bowel obstruction. Appendix is unremarkable.  The colon is underdistended. Diffuse colonic diverticulosis. Left inguinal hernia containing nonobstructed sigmoid colon.    Bladder: Prolapsed with a 6 mm stone in the perineum.  Pelvic organs: Prolapse of the bladder and uterus into the perineum.    There is no significant adenopathy.  Vasculature: The aorta is not dilated. Moderate atherosclerotic vascular calcification.    Retroperitoneum: There is no mass.  Bones: Chronic degenerative changes of the spine. Age-indeterminate mild compression deformity of the superior endplate of L3 without retropulsion into the spinal canal.  Subcutaneous tissues: Right inguinal hernia containing nonobstructed small bowel. Left inguinal hernia containing nonobstructed sigmoid colon.    IMPRESSION:    Nonspecific subcentimeter left lower cervical lymph nodes. Nonspecific left axillary lymph nodes.  Moderate hiatal hernia.  Mild interstitial pulmonary edema. Small bilateral pleuraleffusions, left greater than right.  Splenomegaly.    Prolapse of the bladder and the uterus into the perineum.  Right inguinal hernia containing nonobstructed small bowel. Left inguinal hernia containing nonobstructed colon.    Additional incidentalfindings as described above.    < from: Xray Wrist 3 Views, Left (21 @ 20:51) >    IMPRESSION:    Soft tissue heterogenous mass over the lateral aspect of the distal wrist.     [facility] Surgical Oncology Consultant Evaluation    HPI:  Patient is a 89 y F with no PMHx, not seen by a physician for over 10 years, who presents for increasing growth of a fungating necrotic mass of the left face for the past 3 months. Patient is a poor historian with limited health care literacy. Patient says mass of the right face, soft tissue mass of left wrist and necrotic left shin and has been present for 2-3 years, was initially small (face and wrist lesion: quarter size) and has rapidly grown in size in the last 3 months. Denies bleeding, purulent discharge, pain. Denies dysphagia, odynophagia, difficulty breathing. Denies fevers, N/V, weight loss, chest pain, palpitations. In the ED, received clindamycin, zosyn and vancomycin.     Surgical Oncology consulted for evaluation of resection of facial mass. Patient states that mass has been present for 3 years however worsening over the last few months. Also has mass on L arm. Unsure whether or not she wants surgery but wishing something to be done.       PAST MEDICAL & SURGICAL HISTORY:  Facial mass    S/P         MEDICATIONS  (STANDING):  enoxaparin Injectable 30 milliGRAM(s) SubCutaneous daily  influenza   Vaccine 0.5 milliLiter(s) IntraMuscular once      ___________________________________________  REVIEW OF SYSTEMS:  As stated in History of Present Illness, otherwise non-contributory.   ___________________________________________  PHYSICAL EXAM:  Vital Signs Last 24 Hrs  T(C): 36.4 (2021 13:00), Max: 36.8 (2021 01:02)  T(F): 97.5 (2021 13:00), Max: 98.3 (2021 01:02)  HR: 66 (2021 13:00) (66 - 80)  BP: 112/50 (2021 13:00) (109/50 - 139/59)  BP(mean): --  RR: 17 (2021 13:00) (15 - 17)  SpO2: 96% (2021 13:00) (96% - 99%)CAPILLARY BLOOD GLUCOSE        I&O's Detail      General: No acute distress.  HEENT: Large, malodorous mass of L face.  Respiratory: Breathing non-labored.  Abdomen: Soft, nontender, nondistended. No rebound, no guarding, No palpable organomegaly or masses.  Extremities: Left distal arm mass. Refused examination of LL shin wound.   ____________________________________________  LABS:  CBC Full  -  ( 2021 20:40 )  WBC Count : 110.56 K/uL  RBC Count : 4.30 M/uL  Hemoglobin : 12.0 g/dL  Hematocrit : 39.1 %  Platelet Count - Automated : 246 K/uL  Mean Cell Volume : 90.9 fL  Mean Cell Hemoglobin : 27.9 pg  Mean Cell Hemoglobin Concentration : 30.7 gm/dL  Auto Neutrophil # : 47.54 K/uL  Auto Lymphocyte # : 60.81 K/uL  Auto Monocyte # : 2.21 K/uL  Auto Eosinophil # : 0.00 K/uL  Auto Basophil # : 0.00 K/uL  Auto Neutrophil % : 43.0 %  Auto Lymphocyte % : 55.0 %  Auto Monocyte % : 2.0 %  Auto Eosinophil % : 0.0 %  Auto Basophil % : 0.0 %        141  |  104  |  16  ----------------------------<  107<H>  3.8   |  24  |  1.07    Ca    9.5      2021 20:40    TPro  6.1  /  Alb  3.7  /  TBili  0.4  /  DBili  x   /  AST  9   /  ALT  5   /  AlkPhos  187<H>      LIVER FUNCTIONS - ( 2021 20:40 )  Alb: 3.7 g/dL / Pro: 6.1 g/dL / ALK PHOS: 187 U/L / ALT: 5 U/L / AST: 9 U/L / GGT: x           PT/INR - ( 2021 12:01 )   PT: 14.4 sec;   INR: 1.27 ratio         PTT - ( 2021 12:01 )  PTT:28.7 sec  Urinalysis Basic - ( 2021 05:23 )    Color: Yellow / Appearance: Slightly Turbid / S.041 / pH: x  Gluc: x / Ketone: Negative  / Bili: Negative / Urobili: <2 mg/dL   Blood: x / Protein: 30 mg/dL / Nitrite: Negative   Leuk Esterase: Large / RBC: 2 /HPF /  /HPF   Sq Epi: x / Non Sq Epi: 20 /HPF / Bacteria: Moderate          ____________________________________________  RADIOLOGY:  R< from: CT Head No Cont (21 @ 01:05) >  FINDINGS:    CT HEAD:    No CT evidence of acute intracranial hemorrhage, extra-axial collection, edema, mass effect, midline shift, central herniation, or hydrocephalus. Grey-white matter junction is well-preserved.    Age-related prominence of sulci and ventricles. Patchy white matter hypoattenuation which is nonspecific in etiology but likely related to chronic microvascular ischemic disease.    Left maxillary sinus mild mucosal thickening and left nasal turbinate retention cyst versus polyp.. Bilateral mastoid air cells and middle ear cavities are clear.    Calvarium is intact. Left facial soft tissue mass, partially visualized and described below in maxillofacial section.    CT MAXILLOFACIAL BONES:    Extensive left mid and lower face heterogenous, enhancing soft tissue mass containing droplets of gas measuring approximately 7.1 x 4.6 x 9.3 cm (8:141 and 801:25). Cortical integrity of the adjacent frontal zygomatic process and maxilla is preserved. Deep spaces of the face are preserved.    The patient is edentulous.  No bony destruction.    Submandibular, parotid and sublingual glands are symmetric in size and shape bilaterally. Epiglottic and aryepiglottic folds are not thickened. Oropharyngeal tract is normal in course and caliber.    No cervical lymphadenopathy.    Left maxillary sinus mucosal thickening and left nasal turbinate retention cyst versus polyp.    The globes aresymmetric in size and contour. Extraocular muscles are not enlarged or deviated. No radiopaque orbital foreign bodies are visualized. The retrobulbar fat is preserved.    IMPRESSION:    CT HEAD: No intracranial hemorrhage, edema or mass effect    CT MAXILLOFACIAL BONES: Extensive left face heterogenous enhancing soft tissue mass containing droplets of gas. No associated bony destruction.      r< from: CT Abdomen and Pelvis w/ IV Cont (21 @ 01:06) >  FINDINGS:    There are nonspecific subcentimeter left lower cervical lymph nodes. There is a 1 x 1.5 cm left axillary lymph node.    The tracheobronchial tree is patent centrally.  There is no mediastinal hematoma.  The great vessels are not enlarged. Aberrant right subclavian artery. Coronary atherosclerosis. There is no significant mediastinal or hilar adenopathy.    The heart is not enlarged.  There is no pericardial effusion.    Lungs: Scattered groundglass opacities with septalthickening and pulmonary vascular enlargement compatible with interstitial pulmonary edema. Bibasilar streaky atelectasis.    Pleura: Small bilateral pleural effusions, mild on the left and trace on the right.  There is no free air or focal collection.  No free fluid.    Liver: Normal.  Spleen: The spleen is enlarged, measuring 14.8 x 7.3 x 17.5 cm.  Gallbladder: Unremarkable.  Biliary tree: Unremarkable.  Adrenal glands: Normal.  Pancreas: Normal.  Kidneys: Unremarkable.    Bowel: Moderate hiatal hernia. There is no small bowel obstruction. Appendix is unremarkable.  The colon is underdistended. Diffuse colonic diverticulosis. Left inguinal hernia containing nonobstructed sigmoid colon.    Bladder: Prolapsed with a 6 mm stone in the perineum.  Pelvic organs: Prolapse of the bladder and uterus into the perineum.    There is no significant adenopathy.  Vasculature: The aorta is not dilated. Moderate atherosclerotic vascular calcification.    Retroperitoneum: There is no mass.  Bones: Chronic degenerative changes of the spine. Age-indeterminate mild compression deformity of the superior endplate of L3 without retropulsion into the spinal canal.  Subcutaneous tissues: Right inguinal hernia containing nonobstructed small bowel. Left inguinal hernia containing nonobstructed sigmoid colon.    IMPRESSION:    Nonspecific subcentimeter left lower cervical lymph nodes. Nonspecific left axillary lymph nodes.  Moderate hiatal hernia.  Mild interstitial pulmonary edema. Small bilateral pleuraleffusions, left greater than right.  Splenomegaly.    Prolapse of the bladder and the uterus into the perineum.  Right inguinal hernia containing nonobstructed small bowel. Left inguinal hernia containing nonobstructed colon.    Additional incidentalfindings as described above.    < from: Xray Wrist 3 Views, Left (21 @ 20:51) >    IMPRESSION:    Soft tissue heterogenous mass over the lateral aspect of the distal wrist.

## 2021-02-05 NOTE — ADVANCED PRACTICE NURSE CONSULT - REASON FOR CONSULT
Patient seen on skin care rounds after wound care referral received for assessment of skin impairment and recommendations of topical management. Chart reviewed: Bijan 18, BMI 20kg/m2. As per chart patient has PMHx, not seen by a physician for over 10 years, who presents for increasing growth of a fungating necrotic mass of the left face for the past 3 months. Patient says mass of the right face, soft tissue mass of left wrist and necrotic left shin and has been present for 2-3 years, was initially small (face and wrist lesion: quarter size) and has rapidly grown in size in the last 3 months. Seen by GYN services for uterine prolapse (as per note, patient refused GYN assessment). Seen by surgical team for malignancies (see full note for details).

## 2021-02-05 NOTE — CONSULT NOTE ADULT - ASSESSMENT
89y/o F admitted to medicine service with fungating facial mass, noted to have uterine prolapse on exam by primary team and CTAP. Patient refusing to speak with gynecology team or have GYN exam performed.      - Patient can follow-up outpatient at [facility] OBGYN clinic should she desire further management of her asymptomatic uterine prolapse. Contact information as follows: Ambulatory Clinic Unit, [facility], Oncology Building, ground floor. Phone# [phone]    To be d/w Dr. Estelle Qiu, PGY-2  89y/o F admitted to medicine service with fungating facial mass, noted to have uterine prolapse on exam by primary team and CTAP. Patient refusing to speak with gynecology team or have GYN exam performed, declining any GYN intervention at this time.      - Patient can follow-up outpatient at [facility] OBGYN clinic should she desire further management of her asymptomatic uterine prolapse. Contact information as follows: Ambulatory Clinic Unit, [facility], Oncology Building, ground floor. Phone# [phone]    d/w Dr. Estelle Qiu, PGY-2

## 2021-02-05 NOTE — H&P ADULT - NSHPSOCIALHISTORY_GEN_ALL_CORE
Lives with daughter  Mostly bedbound, walks without walker but endorses unsteady gait, falls in the past, denies hitting head  No HHA, has not seen a doctor in decades  Non-smoker  No alcohol use  No illicit drug history

## 2021-02-05 NOTE — ADVANCED PRACTICE NURSE CONSULT - RECOMMEDATIONS
Recommend follow up care at [facility] ([phone], [address] [place] [address]) or with outpatient oncologist after plan of care is set.    Left arm and Left shin: Cleanse with 0.125% Dakins solution. Apply Liquid barrier film to periwound skin. Apply Aquacel AG hydrofiber, cover with gauze, Cover with ABD (Combine) pad and secure with Kerlix. Change daily.     Left face: Cleanse with NS, pat dry. Daily.   *If active drainage starts can cover with Aquacel AG hydrofiber, gauze and abdominal pad and secure with paper tape. Change daily.    Continue low air loss bed therapy, continue to turn & reposition q2h, continue moisture management with barrier creams & single breathable pad, continue measures to decrease friction/shear/pressure.     Please call wound care service line is further assistance is needed ([id number]).

## 2021-02-05 NOTE — PROVIDER CONTACT NOTE (CRITICAL VALUE NOTIFICATION) - ACTION/TREATMENT ORDERED:
MD made aware. No futher action at this time. Will continue to monitor patient. MD made aware. No further action at this time. Will continue to monitor patient.

## 2021-02-05 NOTE — H&P ADULT - PROBLEM SELECTOR PLAN 4
UA positive for bacteria with LE  -Denying dysuria, hematuria  -Has urinary frequency likely 2/2 to gross bladder and uterine prolapse  -CTM off abx  -Monitor for increasing temp

## 2021-02-05 NOTE — CONSULT NOTE ADULT - ASSESSMENT
89F no pmh presents to ED for a left cheek fungating mass, found to have leukocytosis and hematology consulted for eval:     #leukocytosis  -pending review of smear     #left cheek fungating mass     Dorian Wyman Heme/onc PGY4  89F no pmh presents to ED for a left cheek fungating mass, found to have leukocytosis and hematology consulted for eval:     #leukocytosis  -ANC and ALC both elevated.   -peripheral smear consistent with CLL. pending peripheral flow cytometry   -may be reactive component as well in setting of malignancy   -recommend checkin hep B total core ab  -trend CBC with diff daily, and daily TLS labs: cmp, urick acid, ldh.  -low threshold to start abx and culture if spikes fever    #left cheek and forearm fungating mass  -in setting of possible CLL?   -recommend IR biopsy of lesions   -gen surg consulted and recs appreciated     Dorian Wyman Heme/onc PGY4  89F no pmh presents to ED for a left cheek fungating mass, found to have leukocytosis and hematology consulted for eval:     #leukocytosis  -ANC and ALC both elevated.   -peripheral smear consistent with CLL. pending peripheral flow cytometry   -may be reactive component as well in setting of malignancy   -recommend checkin hep B total core ab  -trend CBC with diff daily, and daily TLS labs: cmp, urick acid, ldh.  -low threshold to start abx and culture if spikes fever    #left cheek and forearm fungating mass  -need to determine etiology ->recommend IR biopsy of lesions   -gen surg consulted and recs appreciated     Dorian Wyman Heme/onc PGY4

## 2021-02-05 NOTE — ADVANCED PRACTICE NURSE CONSULT - ASSESSMENT
A&Ox3, bedbound, incontinent of urine and stool. Skin warm, dry with increased moisture in intertriginous folds, adequate skin turgor. Blanchable erythema on bilateral heels. Patient reports that she does not cover wounds at home and her daughter assists her with wound care when she can. She has not seek treatment or visited MD for wounds.    Left cheek, malignant wound that is extending from skin level. Entire wound measures [id number] and tissue type is 100% fungating tissue. No active drainage. No erythema, no increased warmth and no induration in periwound skin. Goal of care: wound may not improve due to etiology, symptoms management for drainage/odor. *Patient able to open eye and wound extends adjacent to ear.     Left arm, malignant wound that is extending from skin level. Entire wound measures [id number] and tissue type is 100% fungating pink, moist tissue. Moderate amount of serous drainage. No erythema, no increased warmth and no induration in periwound skin. Goal of care: wound may not improve due to etiology, symptoms management for drainage/odor.    Left shin, malignant wound that is extending from skin level. Entire wound measures [id number] and tissue type is 100% fungating, pink, moist tissue. Moderate amount of serous drainage, odor. Periwound skin with circumferential denuded epidermis, exposing pink, moist dermis. No erythema, no increased warmth and no induration in periwound skin. Upon initial assessment there was dried band-aids from home in wound. Goal of care: wound may not improve due to etiology, symptoms management for drainage/odor.    Risk for incontinence associated dermatitis: blanchable erythema of sacral fold to bilateral inner buttock. (+) uterine prolaspe

## 2021-02-05 NOTE — CONSULT NOTE ADULT - SUBJECTIVE AND OBJECTIVE BOX
HPI: 89 yo F with no known pmhx presents to the ED for a left cheek fungating mass that pt states has been present for 3 years. states she has only spoken to a doctor over the phone and has not been evaluated in person for this. Pt also has a similar mass on her L wrist and necrotic L shin, unclear the timeline. Pt states she presents today because "i am desperate". pt denies dysphagia, fever, chills, nausea, vomiting, dysuria, cp, sob, abd pain, diarrhea. Pt states she lives with her daughter. Tania, daughter. Cell - [phone]. Home - [phone]  Daughter states that she has had these lesions on her face and arm since the summer.    Allergies  No Known Allergies    MEDICATIONS  (STANDING):  enoxaparin Injectable 30 milliGRAM(s) SubCutaneous daily  influenza   Vaccine 0.5 milliLiter(s) IntraMuscular once    MEDICATIONS  (PRN):    PAST MEDICAL & SURGICAL HISTORY:  Facial mass  S/P     FAMILY HISTORY: non-contributory      SOCIAL HISTORY: No EtOH, no tobacco    REVIEW OF SYSTEMS:    CONSTITUTIONAL: No weakness, fevers or chills  EYES/ENT: No visual changes;  No vertigo or throat pain   NECK: No pain or stiffness  RESPIRATORY: No cough, wheezing, hemoptysis; No shortness of breath  CARDIOVASCULAR: No chest pain or palpitations  GASTROINTESTINAL: No abdominal or epigastric pain. No nausea, vomiting, or hematemesis; No diarrhea or constipation. No melena or hematochezia.  GENITOURINARY: No dysuria, frequency or hematuria  NEUROLOGICAL: No numbness or weakness  SKIN: see above hpi    Height (cm): 149.9 (:00)  Weight (kg): 45 (:)  BMI (kg/m2): 20 (:)  BSA (m2): 1.37 (:)    T(F): 98.1 (21:00), Max: 98.3 (21 @ 01:02)  HR: 75 (21 07:00)  BP: 126/68 (21 07:00)  RR: 16 (21 07:00)  SpO2: 98% (02-05-21 @ 07:00)  Wt(kg): --    GENERAL: NAD, well-developed  HEAD:  Atraumatic, Normocephalic  EYES: EOMI, conjunctiva and sclera clear  NECK: Supple,  CHEST/LUNG: no inc wob  HEART: Regular rate and rhythm; No murmurs, rubs, or gallops  ABDOMEN: Soft, Nontender  EXTREMITIES:  No clubbing, cyanosis, or edema  NEUROLOGY: non-focal  SKIN: ???????                          12.0   110.56 )-----------( 246      ( 2021 20:40 )             39.1           141  |  104  |  16  ----------------------------<  107<H>  3.8   |  24  |  1.07    Ca    9.5      2021 20:40    TPro  6.1  /  Alb  3.7  /  TBili  0.4  /  DBili  x   /  AST  9   /  ALT  5   /  AlkPhos  187<H>      < from: CT Chest w/ IV Cont (21 @ 01:07) >  FINDINGS:    There are nonspecific subcentimeter left lower cervical lymph nodes. There is a 1 x 1.5 cm left axillary lymph node.    The tracheobronchial tree is patent centrally.  There is no mediastinal hematoma.  The great vessels are not enlarged. Aberrant right subclavian artery. Coronary atherosclerosis. There is no significant mediastinal or hilar adenopathy.    The heart is not enlarged.  There is no pericardial effusion.    Lungs: Scattered groundglass opacities with septalthickening and pulmonary vascular enlargement compatible with interstitial pulmonary edema. Bibasilar streaky atelectasis.    Pleura: Small bilateral pleural effusions, mild on the left and trace on the right.  There is no free air or focal collection.  No free fluid.    Liver: Normal.  Spleen: The spleen is enlarged, measuring 14.8 x 7.3 x 17.5 cm.  Gallbladder: Unremarkable.  Biliary tree: Unremarkable.  Adrenal glands: Normal.  Pancreas: Normal.  Kidneys: Unremarkable.    Bowel: Moderate hiatal hernia. There is no small bowel obstruction. Appendix is unremarkable.  The colon is underdistended. Diffuse colonic diverticulosis. Left inguinal hernia containing nonobstructed sigmoid colon.    Bladder: Prolapsed with a 6 mm stone in the perineum.  Pelvic organs: Prolapse of the bladder and uterus into the perineum.    There is no significant adenopathy.  Vasculature: The aorta is not dilated. Moderate atherosclerotic vascular calcification.    Retroperitoneum: There is no mass.  Bones: Chronic degenerative changes of the spine. Age-indeterminate mild compression deformity of the superior endplate of L3 without retropulsion into the spinal canal.  Subcutaneous tissues: Right inguinal hernia containing nonobstructed small bowel. Left inguinal hernia containing nonobstructed sigmoid colon.    IMPRESSION:    Nonspecific subcentimeter left lower cervical lymph nodes. Nonspecific left axillary lymph nodes.  Moderate hiatal hernia.  Mild interstitial pulmonary edema. Small bilateral pleuraleffusions, left greater than right.  Splenomegaly.    Prolapse of the bladder and the uterus into the perineum.  Right inguinal hernia containing nonobstructed small bowel. Left inguinal hernia containing nonobstructed colon.    Additional incidentalfindings as described above.    < end of copied text >    < from: CT Maxillofacial w/ IV Cont (21 @ 01:06) >  CT HEAD:    No CT evidence of acute intracranial hemorrhage, extra-axial collection, edema, mass effect, midline shift, central herniation, or hydrocephalus. Grey-white matter junction is well-preserved.    Age-related prominence of sulci and ventricles. Patchy white matter hypoattenuation which is nonspecific in etiology but likely related to chronic microvascular ischemic disease.    Left maxillary sinus mild mucosal thickening and left nasal turbinate retention cyst versus polyp.. Bilateral mastoid air cells and middle ear cavities are clear.    Calvarium is intact. Left facial soft tissue mass, partially visualized and described below in maxillofacial section.    CT MAXILLOFACIAL BONES:    Extensive left mid and lower face heterogenous, enhancing soft tissue mass containing droplets of gas measuring approximately 7.1 x 4.6 x 9.3 cm (8:141 and 801:25). Cortical integrity of the adjacent frontal zygomatic process and maxilla is preserved. Deep spaces of the face are preserved.    The patient is edentulous.  No bony destruction.    Submandibular, parotid and sublingual glands are symmetric in size and shape bilaterally. Epiglottic and aryepiglottic folds are not thickened. Oropharyngeal tract is normal in course and caliber.    No cervical lymphadenopathy.    Left maxillary sinus mucosal thickening and left nasal turbinate retention cyst versus polyp.    The globes aresymmetric in size and contour. Extraocular muscles are not enlarged or deviated. No radiopaque orbital foreign bodies are visualized. The retrobulbar fat is preserved.    IMPRESSION:    CT HEAD: No intracranial hemorrhage, edema or mass effect    CT MAXILLOFACIAL BONES: Extensive left face heterogenous enhancing soft tissue mass containing droplets of gas. No associated bony destruction.    < end of copied text >       HPI: 89 yo F with no known pmhx presents to the ED for a left cheek fungating mass that pt states has been present for 3 years. states she has only spoken to a doctor over the phone and has not been evaluated in person for this. Pt also has a similar mass on her L wrist and necrotic L shin, unclear the timeline. Pt denies dysphagia, fever, chills, nausea, vomiting, dysuria, cp, sob, abd pain, diarrhea. Pt states she lives with her daughter. Tania, daughter. Cell - [phone]. Home - [phone]. Daughter states that she has had these lesions on her face and arm since the summer.    Allergies  No Known Allergies    MEDICATIONS  (STANDING):  enoxaparin Injectable 30 milliGRAM(s) SubCutaneous daily  influenza   Vaccine 0.5 milliLiter(s) IntraMuscular once    MEDICATIONS  (PRN):    PAST MEDICAL & SURGICAL HISTORY:  Facial mass  S/P     FAMILY HISTORY: non-contributory      SOCIAL HISTORY: No EtOH, no tobacco    REVIEW OF SYSTEMS:    CONSTITUTIONAL: No weakness, fevers or chills  EYES/ENT: No visual changes;  No vertigo or throat pain   NECK: No pain or stiffness  RESPIRATORY: No cough, wheezing, hemoptysis; No shortness of breath  CARDIOVASCULAR: No chest pain or palpitations  GASTROINTESTINAL: No abdominal or epigastric pain. No nausea, vomiting, or hematemesis; No diarrhea or constipation. No melena or hematochezia.  GENITOURINARY: No dysuria, frequency or hematuria  NEUROLOGICAL: No numbness or weakness  SKIN: see above hpi    Height (cm): 149.9 (:)  Weight (kg): 45 (:)  BMI (kg/m2): 20 (:)  BSA (m2): 1.37 (:)    T(F): 98.1 (21 @ :00), Max: 98.3 (21 @ 01:02)  HR: 75 (21:00)  BP: 126/68 (21 @ 07:00)  RR: 16 (21 07:00)  SpO2: 98% (21:00)  Wt(kg): --    GENERAL: NAD, well-developed  HEAD:  Atraumatic, Normocephalic  EYES: EOMI, conjunctiva and sclera clear  NECK: Supple,  CHEST/LUNG: no inc wob  HEART: Regular rate and rhythm; No murmurs, rubs, or gallops  ABDOMEN: Soft, Nontender  EXTREMITIES:  No clubbing, cyanosis, or edema  NEUROLOGY: non-focal  SKIN: large fungating mass on left cheek. left fungating mass on forearm                          12.0   110.56 )-----------( 246      ( 2021 20:40 )             39.1           141  |  104  |  16  ----------------------------<  107<H>  3.8   |  24  |  1.07    Ca    9.5      2021 20:40    TPro  6.1  /  Alb  3.7  /  TBili  0.4  /  DBili  x   /  AST  9   /  ALT  5   /  AlkPhos  187<H>  -    < from: CT Chest w/ IV Cont (21 @ 01:07) >  FINDINGS:    There are nonspecific subcentimeter left lower cervical lymph nodes. There is a 1 x 1.5 cm left axillary lymph node.    The tracheobronchial tree is patent centrally.  There is no mediastinal hematoma.  The great vessels are not enlarged. Aberrant right subclavian artery. Coronary atherosclerosis. There is no significant mediastinal or hilar adenopathy.    The heart is not enlarged.  There is no pericardial effusion.    Lungs: Scattered groundglass opacities with septalthickening and pulmonary vascular enlargement compatible with interstitial pulmonary edema. Bibasilar streaky atelectasis.    Pleura: Small bilateral pleural effusions, mild on the left and trace on the right.  There is no free air or focal collection.  No free fluid.    Liver: Normal.  Spleen: The spleen is enlarged, measuring 14.8 x 7.3 x 17.5 cm.  Gallbladder: Unremarkable.  Biliary tree: Unremarkable.  Adrenal glands: Normal.  Pancreas: Normal.  Kidneys: Unremarkable.    Bowel: Moderate hiatal hernia. There is no small bowel obstruction. Appendix is unremarkable.  The colon is underdistended. Diffuse colonic diverticulosis. Left inguinal hernia containing nonobstructed sigmoid colon.    Bladder: Prolapsed with a 6 mm stone in the perineum.  Pelvic organs: Prolapse of the bladder and uterus into the perineum.    There is no significant adenopathy.  Vasculature: The aorta is not dilated. Moderate atherosclerotic vascular calcification.    Retroperitoneum: There is no mass.  Bones: Chronic degenerative changes of the spine. Age-indeterminate mild compression deformity of the superior endplate of L3 without retropulsion into the spinal canal.  Subcutaneous tissues: Right inguinal hernia containing nonobstructed small bowel. Left inguinal hernia containing nonobstructed sigmoid colon.    IMPRESSION:    Nonspecific subcentimeter left lower cervical lymph nodes. Nonspecific left axillary lymph nodes.  Moderate hiatal hernia.  Mild interstitial pulmonary edema. Small bilateral pleuraleffusions, left greater than right.  Splenomegaly.    Prolapse of the bladder and the uterus into the perineum.  Right inguinal hernia containing nonobstructed small bowel. Left inguinal hernia containing nonobstructed colon.    Additional incidentalfindings as described above.    < end of copied text >    < from: CT Maxillofacial w/ IV Cont (21 @ 01:06) >  CT HEAD:    No CT evidence of acute intracranial hemorrhage, extra-axial collection, edema, mass effect, midline shift, central herniation, or hydrocephalus. Grey-white matter junction is well-preserved.    Age-related prominence of sulci and ventricles. Patchy white matter hypoattenuation which is nonspecific in etiology but likely related to chronic microvascular ischemic disease.    Left maxillary sinus mild mucosal thickening and left nasal turbinate retention cyst versus polyp.. Bilateral mastoid air cells and middle ear cavities are clear.    Calvarium is intact. Left facial soft tissue mass, partially visualized and described below in maxillofacial section.    CT MAXILLOFACIAL BONES:    Extensive left mid and lower face heterogenous, enhancing soft tissue mass containing droplets of gas measuring approximately 7.1 x 4.6 x 9.3 cm (8:141 and 801:25). Cortical integrity of the adjacent frontal zygomatic process and maxilla is preserved. Deep spaces of the face are preserved.    The patient is edentulous.  No bony destruction.    Submandibular, parotid and sublingual glands are symmetric in size and shape bilaterally. Epiglottic and aryepiglottic folds are not thickened. Oropharyngeal tract is normal in course and caliber.    No cervical lymphadenopathy.    Left maxillary sinus mucosal thickening and left nasal turbinate retention cyst versus polyp.    The globes aresymmetric in size and contour. Extraocular muscles are not enlarged or deviated. No radiopaque orbital foreign bodies are visualized. The retrobulbar fat is preserved.    IMPRESSION:    CT HEAD: No intracranial hemorrhage, edema or mass effect    CT MAXILLOFACIAL BONES: Extensive left face heterogenous enhancing soft tissue mass containing droplets of gas. No associated bony destruction.    < end of copied text >    < from: Xray Tibia + Fibula 2 Views, Left (21 @ 20:51) >  INDINGS:    Softtissue mass at the distal anteromedial aspect of the calf containing central lucency and radiodense rim. Associated heterogeneity of the distal left calf soft tissues. No associated cortical destruction or periosteal reaction. Joint spaces are preserved.    IMPRESSION:    Heterogenous soft tissue mass of the distal left calf.    < end of copied text >

## 2021-02-06 LAB
ALBUMIN SERPL ELPH-MCNC: 3 G/DL — LOW (ref 3.3–5)
ALP SERPL-CCNC: 157 U/L — HIGH (ref 40–120)
ALT FLD-CCNC: <5 U/L — LOW (ref 4–33)
ANION GAP SERPL CALC-SCNC: 13 MMOL/L — SIGNIFICANT CHANGE UP (ref 7–14)
AST SERPL-CCNC: 6 U/L — SIGNIFICANT CHANGE UP (ref 4–32)
BASOPHILS # BLD AUTO: 0.03 K/UL — SIGNIFICANT CHANGE UP (ref 0–0.2)
BASOPHILS NFR BLD AUTO: 0 % — SIGNIFICANT CHANGE UP (ref 0–2)
BILIRUB SERPL-MCNC: 0.3 MG/DL — SIGNIFICANT CHANGE UP (ref 0.2–1.2)
BLD GP AB SCN SERPL QL: NEGATIVE — SIGNIFICANT CHANGE UP
BUN SERPL-MCNC: 12 MG/DL — SIGNIFICANT CHANGE UP (ref 7–23)
CALCIUM SERPL-MCNC: 9.2 MG/DL — SIGNIFICANT CHANGE UP (ref 8.4–10.5)
CHLORIDE SERPL-SCNC: 106 MMOL/L — SIGNIFICANT CHANGE UP (ref 98–107)
CO2 SERPL-SCNC: 21 MMOL/L — LOW (ref 22–31)
CREAT SERPL-MCNC: 0.95 MG/DL — SIGNIFICANT CHANGE UP (ref 0.5–1.3)
CULTURE RESULTS: SIGNIFICANT CHANGE UP
EOSINOPHIL # BLD AUTO: 0.25 K/UL — SIGNIFICANT CHANGE UP (ref 0–0.5)
EOSINOPHIL NFR BLD AUTO: 0.3 % — SIGNIFICANT CHANGE UP (ref 0–6)
GLUCOSE SERPL-MCNC: 89 MG/DL — SIGNIFICANT CHANGE UP (ref 70–99)
HAPTOGLOB SERPL-MCNC: 245 MG/DL — HIGH (ref 34–200)
HCT VFR BLD CALC: 35.2 % — SIGNIFICANT CHANGE UP (ref 34.5–45)
HGB BLD-MCNC: 10.4 G/DL — LOW (ref 11.5–15.5)
IANC: 43.15 K/UL — HIGH (ref 1.5–8.5)
IMM GRANULOCYTES NFR BLD AUTO: 2.6 % — HIGH (ref 0–1.5)
LACTATE BLDV-MCNC: 3.5 MMOL/L — HIGH (ref 0.5–2)
LACTATE BLDV-MCNC: 3.7 MMOL/L — HIGH (ref 0.5–2)
LDH SERPL L TO P-CCNC: 123 U/L — LOW (ref 135–225)
LYMPHOCYTES # BLD AUTO: 47.49 K/UL — HIGH (ref 1–3.3)
LYMPHOCYTES # BLD AUTO: 50.1 % — HIGH (ref 13–44)
MAGNESIUM SERPL-MCNC: 2.2 MG/DL — SIGNIFICANT CHANGE UP (ref 1.6–2.6)
MCHC RBC-ENTMCNC: 26.8 PG — LOW (ref 27–34)
MCHC RBC-ENTMCNC: 29.5 GM/DL — LOW (ref 32–36)
MCV RBC AUTO: 90.7 FL — SIGNIFICANT CHANGE UP (ref 80–100)
MONOCYTES # BLD AUTO: 1.43 K/UL — HIGH (ref 0–0.9)
MONOCYTES NFR BLD AUTO: 1.5 % — LOW (ref 2–14)
NEUTROPHILS # BLD AUTO: 43.15 K/UL — HIGH (ref 1.8–7.4)
NEUTROPHILS NFR BLD AUTO: 45.5 % — SIGNIFICANT CHANGE UP (ref 43–77)
NRBC # BLD: 0 /100 WBCS — SIGNIFICANT CHANGE UP
NRBC # FLD: 0 K/UL — SIGNIFICANT CHANGE UP
PHOSPHATE SERPL-MCNC: 3.1 MG/DL — SIGNIFICANT CHANGE UP (ref 2.5–4.5)
PLATELET # BLD AUTO: 204 K/UL — SIGNIFICANT CHANGE UP (ref 150–400)
POTASSIUM SERPL-MCNC: 4 MMOL/L — SIGNIFICANT CHANGE UP (ref 3.5–5.3)
POTASSIUM SERPL-SCNC: 4 MMOL/L — SIGNIFICANT CHANGE UP (ref 3.5–5.3)
PROT SERPL-MCNC: 5.2 G/DL — LOW (ref 6–8.3)
RBC # BLD: 3.76 M/UL — LOW (ref 3.8–5.2)
RBC # BLD: 3.88 M/UL — SIGNIFICANT CHANGE UP (ref 3.8–5.2)
RBC # FLD: 13.7 % — SIGNIFICANT CHANGE UP (ref 10.3–14.5)
RETICS #: 93.2 K/UL — SIGNIFICANT CHANGE UP (ref 25–125)
RETICS/RBC NFR: 2.5 % — SIGNIFICANT CHANGE UP (ref 0.5–2.5)
RH IG SCN BLD-IMP: POSITIVE — SIGNIFICANT CHANGE UP
SODIUM SERPL-SCNC: 140 MMOL/L — SIGNIFICANT CHANGE UP (ref 135–145)
SPECIMEN SOURCE: SIGNIFICANT CHANGE UP
URATE SERPL-MCNC: 8 MG/DL — HIGH (ref 2.5–7)
WBC # BLD: 94.78 K/UL — CRITICAL HIGH (ref 3.8–10.5)
WBC # FLD AUTO: 94.78 K/UL — CRITICAL HIGH (ref 3.8–10.5)

## 2021-02-06 PROCEDURE — 99233 SBSQ HOSP IP/OBS HIGH 50: CPT | Mod: GC

## 2021-02-06 RX ORDER — SODIUM CHLORIDE 9 MG/ML
1000 INJECTION, SOLUTION INTRAVENOUS
Refills: 0 | Status: DISCONTINUED | OUTPATIENT
Start: 2021-02-06 | End: 2021-02-07

## 2021-02-06 RX ORDER — CEFTRIAXONE 500 MG/1
1000 INJECTION, POWDER, FOR SOLUTION INTRAMUSCULAR; INTRAVENOUS EVERY 24 HOURS
Refills: 0 | Status: COMPLETED | OUTPATIENT
Start: 2021-02-06 | End: 2021-02-08

## 2021-02-06 RX ORDER — SODIUM CHLORIDE 9 MG/ML
1000 INJECTION, SOLUTION INTRAVENOUS ONCE
Refills: 0 | Status: COMPLETED | OUTPATIENT
Start: 2021-02-06 | End: 2021-02-06

## 2021-02-06 RX ADMIN — ENOXAPARIN SODIUM 30 MILLIGRAM(S): 100 INJECTION SUBCUTANEOUS at 11:05

## 2021-02-06 RX ADMIN — SODIUM CHLORIDE 50 MILLILITER(S): 9 INJECTION, SOLUTION INTRAVENOUS at 07:22

## 2021-02-06 RX ADMIN — SODIUM CHLORIDE 1000 MILLILITER(S): 9 INJECTION, SOLUTION INTRAVENOUS at 09:22

## 2021-02-06 RX ADMIN — SODIUM CHLORIDE 75 MILLILITER(S): 9 INJECTION, SOLUTION INTRAVENOUS at 14:16

## 2021-02-06 RX ADMIN — CEFTRIAXONE 100 MILLIGRAM(S): 500 INJECTION, POWDER, FOR SOLUTION INTRAMUSCULAR; INTRAVENOUS at 16:46

## 2021-02-06 NOTE — PROGRESS NOTE ADULT - PROBLEM SELECTOR PLAN 4
UA positive for bacteria with LE  -Denying dysuria, hematuria  -Has urinary frequency likely 2/2 to gross bladder and uterine prolapse  -CTM off abx  -Monitor for increasing temp UA positive for bacteria with LE  -Now endorsing dysuria with frequency  -Ceftriaxone x 3 days  -Monitor for increasing temp

## 2021-02-06 NOTE — PROGRESS NOTE ADULT - PROBLEM SELECTOR PLAN 1
-Large fungating necrotic soft tissue mass on left face containing droplets of gas.  -CT HEAD showing no intracranial hemorrhage, edema or mass effect  -CT MAXILLOFACIAL BONES showing no associated bony destruction  -DDx malignancy (leukemia, T Cell lymphoma, mycosis fungoides, etc), infectious  -Denies exposure, recent trauma to the face  -Surgery Consult placed for resection evaluation -Large fungating necrotic soft tissue mass on left face containing droplets of gas.  -CT HEAD showing no intracranial hemorrhage, edema or mass effect  -CT MAXILLOFACIAL BONES showing no associated bony destruction  -DDx malignancy (leukemia, T Cell lymphoma, mycosis fungoides, etc), infectious  -Denies exposure, recent trauma to the face  -Surgery Consult placed for resection evaluation  -MRI head pending patient decision, unsure if she wants tx or not

## 2021-02-06 NOTE — PHYSICAL THERAPY INITIAL EVALUATION ADULT - PERTINENT HX OF CURRENT PROBLEM, REHAB EVAL
89 year old female with no PMHx, not seen by a physician for over 10 years, presents for increasing growth of a fungating necrotic mass of the left face for the past 3 months. Patient says mass of the right face, soft tissue mass of left wrist and necrotic left shin and has been present for 2-3 years. Problem: Facial mass.  Plan: -Large fungating necrotic soft tissue mass on left face containing droplets of gas. 89 year old female with no PMHx, not seen by a physician for over 10 years, presents for increasing growth of a fungating necrotic mass of the left face for the past 3 months. Patient says mass of the right face, soft tissue mass of left wrist and necrotic left shin and has been present for 2-3 years.  CT HEAD showing no intracranial hemorrhage, edema or mass effect.  CT MAXILLOFACIAL BONES showing no associated bony destruction.

## 2021-02-06 NOTE — PROGRESS NOTE ADULT - PROBLEM SELECTOR PLAN 5
[Follow-Up: _____] : a [unfilled] follow-up visit Notable uterine and bladder prolapse on exam and CT Scan.  -UA positive for bacteria and LE, urinary frequency  -GYN consult [[email or web address]] : Patient returns after completing EMG/NCS.  She states it was normal.  She continues to have neck stiffness and pain radiating to her head.  She is here to discuss her treatment options. Notable uterine and bladder prolapse on exam and CT Scan.  -UA positive for bacteria and LE, urinary frequency  -GYN consult-outpatient follow-up

## 2021-02-06 NOTE — PHYSICAL THERAPY INITIAL EVALUATION ADULT - PLANNED THERAPY INTERVENTIONS, PT EVAL
Patient left supine in bed in NAD, call bell in reach, all lines intact. +Bed alarm/balance training/bed mobility training/gait training/strengthening/transfer training

## 2021-02-06 NOTE — PROGRESS NOTE ADULT - PROBLEM SELECTOR PLAN 2
WBC of 110.56 with lymphocytic predominance with elevated CRP, lactate  DDx malignancy (leukemia ie. CLL, lymphoma), less likely infectious  -CT A/P showing splenomegaly, LAD in left axilla   -LDH, uric acid added for TLS eval  -Peripheral Smear  -Coags, T/S  -LN biopsy for diagnosis and staging  -F/u BCx2, UCx  -GOC conversation regarding treatment options, code status  -Heme-Onc consulted WBC of 110.56 with lymphocytic predominance with elevated CRP, lactate  DDx malignancy (leukemia ie. CLL, lymphoma), less likely infectious  -CT A/P showing splenomegaly, LAD in left axilla   -LDH, uric acid, CMP for TLS eval daily  -Peripheral Smear  -Coags, T/S  -LN biopsy for diagnosis and staging  -F/u BCx2, UCx  -LP via IR pending patient decision, unsure about tx  -GOC conversation regarding treatment options, code status  -Heme-Onc consulted

## 2021-02-06 NOTE — PHYSICAL THERAPY INITIAL EVALUATION ADULT - ADDITIONAL COMMENTS
As per chart, mostly bedbound however, walks without walker but endorses unsteady gait, falls in the past, denies hitting head.  Pt reports owning a rolling walker.

## 2021-02-06 NOTE — PHYSICAL THERAPY INITIAL EVALUATION ADULT - DISCHARGE DISPOSITION, PT EVAL
Anticipate [facility] due to recent falls and patient able to walk short distances prior to admission. Rehab to improve functional mobility and strength and to return to baseline functional status.

## 2021-02-06 NOTE — PROGRESS NOTE ADULT - SUBJECTIVE AND OBJECTIVE BOX
Nereida Robles, PGY-1  Internal Medicine  Pager: [phone]/AINSLEY [phone]    PROGRESS NOTE:     Patient is a 89y old  Female who presents with a chief complaint of worsening swelling of Left face (2021 13:38)      SUBJECTIVE / OVERNIGHT EVENTS:    ADDITIONAL REVIEW OF SYSTEMS:    MEDICATIONS  (STANDING):  enoxaparin Injectable 30 milliGRAM(s) SubCutaneous daily  influenza   Vaccine 0.5 milliLiter(s) IntraMuscular once  lactated ringers. 1000 milliLiter(s) (50 mL/Hr) IV Continuous <Continuous>    MEDICATIONS  (PRN):      CAPILLARY BLOOD GLUCOSE        I&O's Summary      PHYSICAL EXAM:  Vital Signs Last 24 Hrs  T(C): 36.4 (2021 04:00), Max: 36.7 (2021 07:00)  T(F): 97.5 (2021 04:00), Max: 98.1 (2021 07:00)  HR: 110 (2021 04:00) (57 - 110)  BP: 101/55 (2021 04:00) (101/55 - 126/68)  BP(mean): --  RR: 17 (2021 04:00) (16 - 18)  SpO2: 97% (2021 04:00) (96% - 98%)    CONSTITUTIONAL: NAD, well-developed  RESPIRATORY: Normal respiratory effort; lungs are clear to auscultation bilaterally  CARDIOVASCULAR: Regular rate and rhythm, normal S1 and S2, no murmur/rub/gallop; No lower extremity edema; Peripheral pulses are 2+ bilaterally  ABDOMEN: Nontender to palpation, normoactive bowel sounds, no rebound/guarding; No hepatosplenomegaly  MUSCLOSKELETAL: no clubbing or cyanosis of digits; no joint swelling or tenderness to palpation  PSYCH: A+O to person, place, and time; affect appropriate    LABS:                        10.2   100.00 )-----------( 205      ( 2021 19:58 )             33.4     02-05    139  |  106  |  15  ----------------------------<  108<H>  3.7   |  20<L>  |  1.03    Ca    9.4      2021 20:03  Phos  3.4     02-05  Mg     2.3     02-05    TPro  5.5<L>  /  Alb  3.0<L>  /  TBili  0.4  /  DBili  x   /  AST  8   /  ALT  <5<L>  /  AlkPhos  179<H>  02-05    PT/INR - ( 2021 19:58 )   PT: 14.8 sec;   INR: 1.32 ratio         PTT - ( 2021 19:58 )  PTT:26.8 sec      Urinalysis Basic - ( 2021 05:23 )    Color: Yellow / Appearance: Slightly Turbid / S.041 / pH: x  Gluc: x / Ketone: Negative  / Bili: Negative / Urobili: <2 mg/dL   Blood: x / Protein: 30 mg/dL / Nitrite: Negative   Leuk Esterase: Large / RBC: 2 /HPF /  /HPF   Sq Epi: x / Non Sq Epi: 20 /HPF / Bacteria: Moderate          RADIOLOGY & ADDITIONAL TESTS:  Results Reviewed:   Imaging Personally Reviewed:  Electrocardiogram Personally Reviewed:    COORDINATION OF CARE:  Care Discussed with Consultants/Other Providers [Y/N]:  Prior or Outpatient Records Reviewed [Y/N]:   Nereida Robles, PGY-1  Internal Medicine  Pager: [phone]/JONATHAN [phone]    PROGRESS NOTE:     Patient is a 89y old  Female who presents with a chief complaint of worsening swelling of Left face (2021 13:38)      SUBJECTIVE / OVERNIGHT EVENTS: No acute events overnight. Pt seen and examined at bedside. Denies fever, chills, nausea, vomiting, cp, or sob.     ADDITIONAL REVIEW OF SYSTEMS: Above    MEDICATIONS  (STANDING):  enoxaparin Injectable 30 milliGRAM(s) SubCutaneous daily  influenza   Vaccine 0.5 milliLiter(s) IntraMuscular once  lactated ringers. 1000 milliLiter(s) (50 mL/Hr) IV Continuous <Continuous>    MEDICATIONS  (PRN):      CAPILLARY BLOOD GLUCOSE        I&O's Summary      PHYSICAL EXAM:  Vital Signs Last 24 Hrs  T(C): 36.4 (2021 04:00), Max: 36.7 (2021 07:00)  T(F): 97.5 (2021 04:00), Max: 98.1 (2021 07:00)  HR: 110 (2021 04:00) (57 - 110)  BP: 101/55 (2021 04:00) (101/55 - 126/68)  BP(mean): --  RR: 17 (2021 04:00) (16 - 18)  SpO2: 97% (2021 04:00) (96% - 98%)    GENERAL: NAD, elderly female  HEAD: +Large fungating malodorous soft tissue mass localized to left cheek, doesn't cross midline, +non-tender, otherwise normocephalic and normal facies  EYES: EOMI, PERRLA, conjunctiva and sclera clear  ENMT: No tonsillar erythema, exudates, or enlargement; Moist mucous membranes  NECK: +Erythema of left neck. Supple, No JVD, Normal Thyroid  CHEST/LUNG: Decreased lung sounds on left lower lobe, otherwise clear to auscultation bilaterally; No rales, rhonchi, wheezing, or rubs  HEART: Regular rate and rhythm; S1 and S2; No murmurs, rubs, or gallops  ABDOMEN: +Splenomegaly. Soft, Nontender, Nondistended: Bowel sounds present  EXTREMITIES: 2+ Peripheral Pulses, No clubbing, cyanosis, or edema  LYMPH: +Left axillary LAD  SKIN: +Left shin necrotic lesion, left wrist mass with bandaging c/d/i    LABS:                        10.2   100.00 )-----------( 205      ( 2021 19:58 )             33.4     02-05    139  |  106  |  15  ----------------------------<  108<H>  3.7   |  20<L>  |  1.03    Ca    9.4      2021 20:03  Phos  3.4     02-05  Mg     2.3     02-05    TPro  5.5<L>  /  Alb  3.0<L>  /  TBili  0.4  /  DBili  x   /  AST  8   /  ALT  <5<L>  /  AlkPhos  179<H>  02-05    PT/INR - ( 2021 19:58 )   PT: 14.8 sec;   INR: 1.32 ratio         PTT - ( 2021 19:58 )  PTT:26.8 sec      Urinalysis Basic - ( 2021 05:23 )    Color: Yellow / Appearance: Slightly Turbid / S.041 / pH: x  Gluc: x / Ketone: Negative  / Bili: Negative / Urobili: <2 mg/dL   Blood: x / Protein: 30 mg/dL / Nitrite: Negative   Leuk Esterase: Large / RBC: 2 /HPF /  /HPF   Sq Epi: x / Non Sq Epi: 20 /HPF / Bacteria: Moderate          RADIOLOGY & ADDITIONAL TESTS:  Results Reviewed:   Imaging Personally Reviewed:  Electrocardiogram Personally Reviewed:    COORDINATION OF CARE:  Care Discussed with Consultants/Other Providers [Y/N]:  Prior or Outpatient Records Reviewed [Y/N]:

## 2021-02-06 NOTE — PROGRESS NOTE ADULT - PROBLEM SELECTOR PLAN 3
Left necrotic wound on left shin with soft tissue wound on left wrist  -XR confirming heterogenous soft tissue masses of both  -Wound care consult Left necrotic wound on left shin with soft tissue wound on left wrist  -XR confirming heterogenous soft tissue masses of both  -Wound care recs in

## 2021-02-07 LAB
ALBUMIN SERPL ELPH-MCNC: 3 G/DL — LOW (ref 3.3–5)
ALP SERPL-CCNC: 141 U/L — HIGH (ref 40–120)
ALT FLD-CCNC: <5 U/L — SIGNIFICANT CHANGE UP (ref 4–33)
ANION GAP SERPL CALC-SCNC: 11 MMOL/L — SIGNIFICANT CHANGE UP (ref 7–14)
AST SERPL-CCNC: 6 U/L — SIGNIFICANT CHANGE UP (ref 4–32)
BASOPHILS # BLD AUTO: 0.91 K/UL — HIGH (ref 0–0.2)
BASOPHILS NFR BLD AUTO: 0.9 % — SIGNIFICANT CHANGE UP (ref 0–2)
BILIRUB SERPL-MCNC: 0.2 MG/DL — SIGNIFICANT CHANGE UP (ref 0.2–1.2)
BUN SERPL-MCNC: 11 MG/DL — SIGNIFICANT CHANGE UP (ref 7–23)
CALCIUM SERPL-MCNC: 9 MG/DL — SIGNIFICANT CHANGE UP (ref 8.4–10.5)
CHLORIDE SERPL-SCNC: 108 MMOL/L — HIGH (ref 98–107)
CO2 SERPL-SCNC: 22 MMOL/L — SIGNIFICANT CHANGE UP (ref 22–31)
CREAT SERPL-MCNC: 1.06 MG/DL — SIGNIFICANT CHANGE UP (ref 0.5–1.3)
EOSINOPHIL # BLD AUTO: 0 K/UL — SIGNIFICANT CHANGE UP (ref 0–0.5)
EOSINOPHIL NFR BLD AUTO: 0 % — SIGNIFICANT CHANGE UP (ref 0–6)
GLUCOSE SERPL-MCNC: 102 MG/DL — HIGH (ref 70–99)
HBV CORE AB SER-ACNC: SIGNIFICANT CHANGE UP
HCT VFR BLD CALC: 33.2 % — LOW (ref 34.5–45)
HGB BLD-MCNC: 9.8 G/DL — LOW (ref 11.5–15.5)
IANC: 41.76 K/UL — HIGH (ref 1.5–8.5)
LDH SERPL L TO P-CCNC: 111 U/L — LOW (ref 135–225)
LYMPHOCYTES # BLD AUTO: 26.8 % — SIGNIFICANT CHANGE UP (ref 13–44)
LYMPHOCYTES # BLD AUTO: 27.05 K/UL — HIGH (ref 1–3.3)
MAGNESIUM SERPL-MCNC: 2.1 MG/DL — SIGNIFICANT CHANGE UP (ref 1.6–2.6)
MCHC RBC-ENTMCNC: 26.7 PG — LOW (ref 27–34)
MCHC RBC-ENTMCNC: 29.5 GM/DL — LOW (ref 32–36)
MCV RBC AUTO: 90.5 FL — SIGNIFICANT CHANGE UP (ref 80–100)
MONOCYTES # BLD AUTO: 0 K/UL — SIGNIFICANT CHANGE UP (ref 0–0.9)
MONOCYTES NFR BLD AUTO: 0 % — LOW (ref 2–14)
NEUTROPHILS # BLD AUTO: 54.09 K/UL — HIGH (ref 1.8–7.4)
NEUTROPHILS NFR BLD AUTO: 51.8 % — SIGNIFICANT CHANGE UP (ref 43–77)
PHOSPHATE SERPL-MCNC: 3.2 MG/DL — SIGNIFICANT CHANGE UP (ref 2.5–4.5)
PLATELET # BLD AUTO: 213 K/UL — SIGNIFICANT CHANGE UP (ref 150–400)
POTASSIUM SERPL-MCNC: 3.5 MMOL/L — SIGNIFICANT CHANGE UP (ref 3.5–5.3)
POTASSIUM SERPL-SCNC: 3.5 MMOL/L — SIGNIFICANT CHANGE UP (ref 3.5–5.3)
PROT SERPL-MCNC: 4.9 G/DL — LOW (ref 6–8.3)
RBC # BLD: 3.67 M/UL — LOW (ref 3.8–5.2)
RBC # FLD: 13.7 % — SIGNIFICANT CHANGE UP (ref 10.3–14.5)
SODIUM SERPL-SCNC: 141 MMOL/L — SIGNIFICANT CHANGE UP (ref 135–145)
URATE SERPL-MCNC: 8 MG/DL — HIGH (ref 2.5–7)
WBC # BLD: 100.92 K/UL — CRITICAL HIGH (ref 3.8–10.5)
WBC # FLD AUTO: 100.92 K/UL — CRITICAL HIGH (ref 3.8–10.5)

## 2021-02-07 PROCEDURE — 85060 BLOOD SMEAR INTERPRETATION: CPT

## 2021-02-07 PROCEDURE — 99233 SBSQ HOSP IP/OBS HIGH 50: CPT | Mod: GC

## 2021-02-07 RX ORDER — SODIUM CHLORIDE 9 MG/ML
1000 INJECTION, SOLUTION INTRAVENOUS
Refills: 0 | Status: DISCONTINUED | OUTPATIENT
Start: 2021-02-07 | End: 2021-02-08

## 2021-02-07 RX ORDER — SODIUM CHLORIDE 9 MG/ML
1000 INJECTION, SOLUTION INTRAVENOUS
Refills: 0 | Status: DISCONTINUED | OUTPATIENT
Start: 2021-02-07 | End: 2021-02-07

## 2021-02-07 RX ORDER — SODIUM HYPOCHLORITE 0.125 %
1 SOLUTION, NON-ORAL MISCELLANEOUS DAILY
Refills: 0 | Status: DISCONTINUED | OUTPATIENT
Start: 2021-02-07 | End: 2021-02-16

## 2021-02-07 RX ADMIN — ENOXAPARIN SODIUM 30 MILLIGRAM(S): 100 INJECTION SUBCUTANEOUS at 12:38

## 2021-02-07 RX ADMIN — CEFTRIAXONE 100 MILLIGRAM(S): 500 INJECTION, POWDER, FOR SOLUTION INTRAMUSCULAR; INTRAVENOUS at 16:47

## 2021-02-07 RX ADMIN — SODIUM CHLORIDE 50 MILLILITER(S): 9 INJECTION, SOLUTION INTRAVENOUS at 16:47

## 2021-02-07 NOTE — PROGRESS NOTE ADULT - PROBLEM SELECTOR PLAN 4
UA positive for bacteria with LE  -Now endorsing dysuria with frequency  -Ceftriaxone x 3 days  -Monitor for increasing temp

## 2021-02-07 NOTE — PROGRESS NOTE ADULT - SUBJECTIVE AND OBJECTIVE BOX
Charito Manzo MD-PGY3  Department of Internal Medicine  Pager [phone]    PROGRESS NOTE:       Patient is a 89y old  Female who presents with a chief complaint of worsening swelling of Left face (06 Feb 2021 06:55)      SUBJECTIVE / OVERNIGHT EVENTS:    ADDITIONAL REVIEW OF SYSTEMS:    MEDICATIONS  (STANDING):  cefTRIAXone   IVPB 1000 milliGRAM(s) IV Intermittent every 24 hours  enoxaparin Injectable 30 milliGRAM(s) SubCutaneous daily  influenza   Vaccine 0.5 milliLiter(s) IntraMuscular once  lactated ringers. 1000 milliLiter(s) (75 mL/Hr) IV Continuous <Continuous>    MEDICATIONS  (PRN):      CAPILLARY BLOOD GLUCOSE        I&O's Summary    06 Feb 2021 07:01  -  07 Feb 2021 07:00  --------------------------------------------------------  IN: 2190 mL / OUT: 325 mL / NET: 1865 mL        PHYSICAL EXAM:  Vital Signs Last 24 Hrs  T(C): 36.7 (07 Feb 2021 04:30), Max: 36.8 (06 Feb 2021 20:00)  T(F): 98.1 (07 Feb 2021 04:30), Max: 98.2 (06 Feb 2021 20:00)  HR: 62 (07 Feb 2021 04:30) (62 - 79)  BP: 112/50 (07 Feb 2021 04:30) (101/55 - 114/62)  BP(mean): --  RR: 16 (07 Feb 2021 04:30) (16 - 16)  SpO2: 96% (07 Feb 2021 04:30) (94% - 98%)    CONSTITUTIONAL: NAD, well-developed  RESPIRATORY: Normal respiratory effort; lungs are clear to auscultation bilaterally  CARDIOVASCULAR: Regular rate and rhythm, normal S1 and S2, no murmur/rub/gallop; No lower extremity edema; Peripheral pulses are 2+ bilaterally  ABDOMEN: Nontender to palpation, normoactive bowel sounds, no rebound/guarding; No hepatosplenomegaly  MUSCLOSKELETAL: no clubbing or cyanosis of digits; no joint swelling or tenderness to palpation  PSYCH: A+O to person, place, and time; affect appropriate    LABS:                        9.8    x     )-----------( 213      ( 07 Feb 2021 07:22 )             33.2     02-06    140  |  106  |  12  ----------------------------<  89  4.0   |  21<L>  |  0.95    Ca    9.2      06 Feb 2021 11:20  Phos  3.1     02-06  Mg     2.2     02-06    TPro  5.2<L>  /  Alb  3.0<L>  /  TBili  0.3  /  DBili  x   /  AST  6   /  ALT  <5<L>  /  AlkPhos  157<H>  02-06    PT/INR - ( 05 Feb 2021 19:58 )   PT: 14.8 sec;   INR: 1.32 ratio         PTT - ( 05 Feb 2021 19:58 )  PTT:26.8 sec          Culture - Blood (collected 05 Feb 2021 06:59)  Source: .Blood Blood-Venous  Preliminary Report (06 Feb 2021 07:02):    No growth to date.    Culture - Blood (collected 05 Feb 2021 06:59)  Source: .Blood Blood-Peripheral  Preliminary Report (06 Feb 2021 07:02):    No growth to date.    Culture - Urine (collected 05 Feb 2021 04:13)  Source: .Urine Clean Catch (Midstream)  Final Report (06 Feb 2021 14:57):    >=3 organisms. Probable collection contamination.        RADIOLOGY & ADDITIONAL TESTS:  Results Reviewed:   Imaging Personally Reviewed:  Electrocardiogram Personally Reviewed:    COORDINATION OF CARE:  Care Discussed with Consultants/Other Providers [Y/N]:  Prior or Outpatient Records Reviewed [Y/N]:   Charito Manzo MD-PGY3  Department of Internal Medicine  Pager [phone]    PROGRESS NOTE:       Patient is a 89y old  Female who presents with a chief complaint of worsening swelling of Left face (06 Feb 2021 06:55)      SUBJECTIVE / OVERNIGHT EVENTS: none. Pt is AAOx3, and reports she does not want to biopsy or pursue further workup or surgical intervention for fungating mass. She feels it is too dangerous for someone her age.     ADDITIONAL REVIEW OF SYSTEMS:    MEDICATIONS  (STANDING):  cefTRIAXone   IVPB 1000 milliGRAM(s) IV Intermittent every 24 hours  enoxaparin Injectable 30 milliGRAM(s) SubCutaneous daily  influenza   Vaccine 0.5 milliLiter(s) IntraMuscular once  lactated ringers. 1000 milliLiter(s) (75 mL/Hr) IV Continuous <Continuous>    MEDICATIONS  (PRN):      CAPILLARY BLOOD GLUCOSE        I&O's Summary    06 Feb 2021 07:01  -  07 Feb 2021 07:00  --------------------------------------------------------  IN: 2190 mL / OUT: 325 mL / NET: 1865 mL        PHYSICAL EXAM:  Vital Signs Last 24 Hrs  T(C): 36.7 (07 Feb 2021 04:30), Max: 36.8 (06 Feb 2021 20:00)  T(F): 98.1 (07 Feb 2021 04:30), Max: 98.2 (06 Feb 2021 20:00)  HR: 62 (07 Feb 2021 04:30) (62 - 79)  BP: 112/50 (07 Feb 2021 04:30) (101/55 - 114/62)  BP(mean): --  RR: 16 (07 Feb 2021 04:30) (16 - 16)  SpO2: 96% (07 Feb 2021 04:30) (94% - 98%)    CONSTITUTIONAL: NAD, well-developed  RESPIRATORY: Normal respiratory effort; lungs are clear to auscultation bilaterally  CARDIOVASCULAR: Regular rate and rhythm, normal S1 and S2, no murmur/rub/gallop; No lower extremity edema; Peripheral pulses are 2+ bilaterally  ABDOMEN: Nontender to palpation, normoactive bowel sounds, no rebound/guarding; No hepatosplenomegaly  MUSCLOSKELETAL: no clubbing or cyanosis of digits; no joint swelling or tenderness to palpation  PSYCH: A+O to person, place, and time; affect appropriate    LABS:                        9.8    x     )-----------( 213      ( 07 Feb 2021 07:22 )             33.2     02-06    140  |  106  |  12  ----------------------------<  89  4.0   |  21<L>  |  0.95    Ca    9.2      06 Feb 2021 11:20  Phos  3.1     02-06  Mg     2.2     02-06    TPro  5.2<L>  /  Alb  3.0<L>  /  TBili  0.3  /  DBili  x   /  AST  6   /  ALT  <5<L>  /  AlkPhos  157<H>  02-06    PT/INR - ( 05 Feb 2021 19:58 )   PT: 14.8 sec;   INR: 1.32 ratio         PTT - ( 05 Feb 2021 19:58 )  PTT:26.8 sec          Culture - Blood (collected 05 Feb 2021 06:59)  Source: .Blood Blood-Venous  Preliminary Report (06 Feb 2021 07:02):    No growth to date.    Culture - Blood (collected 05 Feb 2021 06:59)  Source: .Blood Blood-Peripheral  Preliminary Report (06 Feb 2021 07:02):    No growth to date.    Culture - Urine (collected 05 Feb 2021 04:13)  Source: .Urine Clean Catch (Midstream)  Final Report (06 Feb 2021 14:57):    >=3 organisms. Probable collection contamination.        RADIOLOGY & ADDITIONAL TESTS:  Results Reviewed:   Imaging Personally Reviewed:  Electrocardiogram Personally Reviewed:    COORDINATION OF CARE:  Care Discussed with Consultants/Other Providers [Y/N]:  Prior or Outpatient Records Reviewed [Y/N]:   Charito Manzo MD-PGY3  Department of Internal Medicine  Pager [phone]    PROGRESS NOTE:       Patient is a 89y old  Female who presents with a chief complaint of worsening swelling of Left face (06 Feb 2021 06:55)      SUBJECTIVE / OVERNIGHT EVENTS: none. Pt is AAOx3, and reports she does not want to biopsy or pursue further workup or surgical intervention for fungating mass. She feels it is too dangerous for someone her age.     ADDITIONAL REVIEW OF SYSTEMS:    MEDICATIONS  (STANDING):  cefTRIAXone   IVPB 1000 milliGRAM(s) IV Intermittent every 24 hours  enoxaparin Injectable 30 milliGRAM(s) SubCutaneous daily  influenza   Vaccine 0.5 milliLiter(s) IntraMuscular once  lactated ringers. 1000 milliLiter(s) (75 mL/Hr) IV Continuous <Continuous>    MEDICATIONS  (PRN):      CAPILLARY BLOOD GLUCOSE        I&O's Summary    06 Feb 2021 07:01  -  07 Feb 2021 07:00  --------------------------------------------------------  IN: 2190 mL / OUT: 325 mL / NET: 1865 mL        PHYSICAL EXAM:  Vital Signs Last 24 Hrs  T(C): 36.7 (07 Feb 2021 04:30), Max: 36.8 (06 Feb 2021 20:00)  T(F): 98.1 (07 Feb 2021 04:30), Max: 98.2 (06 Feb 2021 20:00)  HR: 62 (07 Feb 2021 04:30) (62 - 79)  BP: 112/50 (07 Feb 2021 04:30) (101/55 - 114/62)  BP(mean): --  RR: 16 (07 Feb 2021 04:30) (16 - 16)  SpO2: 96% (07 Feb 2021 04:30) (94% - 98%)    GENERAL: NAD, elderly female  HEAD: +Large fungating malodorous soft tissue mass localized to left cheek, doesn't cross midline, +non-tender, otherwise normocephalic and normal facies  EYES: EOMI, PERRLA, conjunctiva and sclera clear  ENMT: No tonsillar erythema, exudates, or enlargement; Moist mucous membranes  NECK: +Erythema of left neck. Supple, No JVD, Normal Thyroid  CHEST/LUNG: Decreased lung sounds on left lower lobe, otherwise clear to auscultation bilaterally; No rales, rhonchi, wheezing, or rubs  HEART: Regular rate and rhythm; S1 and S2; No murmurs, rubs, or gallops  ABDOMEN: +Splenomegaly. Soft, Nontender, Nondistended: Bowel sounds present  EXTREMITIES: 2+ Peripheral Pulses, No clubbing, cyanosis, or edema  LYMPH: +Left axillary LAD  SKIN: +Left shin necrotic lesion, left wrist mass with bandaging c/d/i    LABS:                        9.8    x     )-----------( 213      ( 07 Feb 2021 07:22 )             33.2     02-06    140  |  106  |  12  ----------------------------<  89  4.0   |  21<L>  |  0.95    Ca    9.2      06 Feb 2021 11:20  Phos  3.1     02-06  Mg     2.2     02-06    TPro  5.2<L>  /  Alb  3.0<L>  /  TBili  0.3  /  DBili  x   /  AST  6   /  ALT  <5<L>  /  AlkPhos  157<H>  02-06    PT/INR - ( 05 Feb 2021 19:58 )   PT: 14.8 sec;   INR: 1.32 ratio         PTT - ( 05 Feb 2021 19:58 )  PTT:26.8 sec          Culture - Blood (collected 05 Feb 2021 06:59)  Source: .Blood Blood-Venous  Preliminary Report (06 Feb 2021 07:02):    No growth to date.    Culture - Blood (collected 05 Feb 2021 06:59)  Source: .Blood Blood-Peripheral  Preliminary Report (06 Feb 2021 07:02):    No growth to date.    Culture - Urine (collected 05 Feb 2021 04:13)  Source: .Urine Clean Catch (Midstream)  Final Report (06 Feb 2021 14:57):    >=3 organisms. Probable collection contamination.        RADIOLOGY & ADDITIONAL TESTS:  Results Reviewed:   Imaging Personally Reviewed:  Electrocardiogram Personally Reviewed:    COORDINATION OF CARE:  Care Discussed with Consultants/Other Providers [Y/N]:  Prior or Outpatient Records Reviewed [Y/N]:

## 2021-02-07 NOTE — PROGRESS NOTE ADULT - PROBLEM SELECTOR PLAN 5
Notable uterine and bladder prolapse on exam and CT Scan.  -UA positive for bacteria and LE, urinary frequency  -GYN consult-outpatient follow-up

## 2021-02-07 NOTE — PROGRESS NOTE ADULT - PROBLEM SELECTOR PLAN 1
-Large fungating necrotic soft tissue mass on left face containing droplets of gas.  -CT HEAD showing no intracranial hemorrhage, edema or mass effect  -CT MAXILLOFACIAL BONES showing no associated bony destruction  -DDx malignancy (leukemia, T Cell lymphoma, mycosis fungoides, etc), infectious  -Denies exposure, recent trauma to the face  -Surgery Consult placed for resection evaluation  -MRI head pending patient decision, unsure if she wants tx or not -Large fungating necrotic soft tissue mass on left face containing droplets of gas.  -CT HEAD showing no intracranial hemorrhage, edema or mass effect  -CT MAXILLOFACIAL BONES showing no associated bony destruction  -DDx malignancy (leukemia, T Cell lymphoma, mycosis fungoides, etc), infectious  -Denies exposure, recent trauma to the face  -Surgery Consult placed for resection evaluation  -MRI head pending patient decision, pt states today that she does not want to work up this mass and does not want biopsy or surgical intervention for it. However, pt asked not to talk further about it today

## 2021-02-07 NOTE — PROGRESS NOTE ADULT - PROBLEM SELECTOR PLAN 3
Left necrotic wound on left shin with soft tissue wound on left wrist  -XR confirming heterogenous soft tissue masses of both  -Wound care recs in

## 2021-02-07 NOTE — PROGRESS NOTE ADULT - PROBLEM SELECTOR PLAN 2
WBC of 110.56 with lymphocytic predominance with elevated CRP, lactate  DDx malignancy (leukemia ie. CLL, lymphoma), less likely infectious  -CT A/P showing splenomegaly, LAD in left axilla   -LDH, uric acid, CMP for TLS eval daily  -Peripheral Smear  -Coags, T/S  -LN biopsy for diagnosis and staging  -F/u BCx2, UCx  -LP via IR pending patient decision, unsure about tx  -GOC conversation regarding treatment options, code status  -Heme-Onc consulted WBC of 110.56 with lymphocytic predominance with elevated CRP, lactate  DDx malignancy (leukemia ie. CLL, lymphoma), less likely infectious  -CT A/P showing splenomegaly, LAD in left axilla   -LDH, uric acid, CMP for TLS eval daily  -Peripheral Smear  -Coags, T/S  -LN biopsy for diagnosis and staging  -F/u BCx2, UCx  -LP via IR pending patient final decision   -GOC conversation regarding treatment options, code status  -Heme-Onc consulted  f/u flow cytometry

## 2021-02-07 NOTE — PROVIDER CONTACT NOTE (OTHER) - ACTION/TREATMENT ORDERED:
MD made aware, no new orders, continue to monitor
MD made aware. No further action at this time. Will continue to monitor patient.

## 2021-02-07 NOTE — PROVIDER CONTACT NOTE (OTHER) - ASSESSMENT
/50 T 97.5 P 62 RR 16 oxygen sat RA 96; not in distress, alert and responsive, px verbalized that she is okay, no complaints verbalized

## 2021-02-08 PROCEDURE — 99233 SBSQ HOSP IP/OBS HIGH 50: CPT | Mod: GC

## 2021-02-08 RX ADMIN — CEFTRIAXONE 100 MILLIGRAM(S): 500 INJECTION, POWDER, FOR SOLUTION INTRAMUSCULAR; INTRAVENOUS at 11:36

## 2021-02-08 RX ADMIN — ENOXAPARIN SODIUM 30 MILLIGRAM(S): 100 INJECTION SUBCUTANEOUS at 11:36

## 2021-02-08 RX ADMIN — Medication 1 APPLICATION(S): at 11:36

## 2021-02-08 NOTE — PROGRESS NOTE ADULT - PROBLEM SELECTOR PLAN 4
UA positive for bacteria with LE  -Now endorsing dysuria with frequency  -Ceftriaxone x 3 days  -Monitor for increasing temp UA positive for bacteria with LE  -Now endorsing dysuria with frequency  -Ceftriaxone x 3 days (2/6-2/8)  -Monitor for increasing temp

## 2021-02-08 NOTE — PROGRESS NOTE ADULT - PROBLEM SELECTOR PLAN 2
WBC of 110.56 with lymphocytic predominance with elevated CRP, lactate  DDx malignancy (leukemia ie. CLL, lymphoma), less likely infectious  -CT A/P showing splenomegaly, LAD in left axilla   -LDH, uric acid, CMP for TLS eval daily  -Peripheral Smear  -Coags, T/S  -LN biopsy for diagnosis and staging  -F/u BCx2, UCx  -LP via IR pending patient final decision   -GOC conversation regarding treatment options, code status  -Heme-Onc consulted  f/u flow cytometry WBC of 110.56 with lymphocytic predominance with elevated CRP, lactate  DDx malignancy (leukemia ie. CLL, lymphoma), less likely infectious  -CT A/P showing splenomegaly, LAD in left axilla   -LDH, uric acid, CMP for TLS eval daily  -Peripheral Smear  -Coags, T/S  -F/u flow cytometry  -BCx2 NGTD  -UCx probable contaminant  -As above, patient does not want to LN biopsy. Informed of high likelihood of cancer and understands. Has full capacity to make this decision.   -From GOC conversation: Unable to decide on code status after several GOC convo. Patient does not want to discuss code status further.  -Heme-Onc consulted

## 2021-02-08 NOTE — PROGRESS NOTE ADULT - PROBLEM SELECTOR PLAN 1
-Large fungating necrotic soft tissue mass on left face containing droplets of gas.  -CT HEAD showing no intracranial hemorrhage, edema or mass effect  -CT MAXILLOFACIAL BONES showing no associated bony destruction  -DDx malignancy (leukemia, T Cell lymphoma, mycosis fungoides, etc), infectious  -Denies exposure, recent trauma to the face  -Surgery Consult placed for resection evaluation  -MRI head pending patient decision, pt states today that she does not want to work up this mass and does not want biopsy or surgical intervention for it. However, pt asked not to talk further about it today -Large fungating necrotic soft tissue mass on left face containing droplets of gas.  -CT HEAD showing no intracranial hemorrhage, edema or mass effect  -CT MAXILLOFACIAL BONES showing no associated bony destruction  -DDx malignancy (leukemia, T Cell lymphoma, mycosis fungoides, etc), infectious  -Denies exposure, recent trauma to the face  -Surgery Consult placed for resection evaluation  -Re: MRI, pt states that she does not want to work up this mass and does not want biopsy or surgical intervention for it. Reiterated sentiment today. Has full capacity to make this decision.  -Will discuss with patient's daughter regarding patient's decision

## 2021-02-08 NOTE — PROGRESS NOTE ADULT - SUBJECTIVE AND OBJECTIVE BOX
Nereida Robles, PGY-1  Internal Medicine  Pager: [phone]/JONATHAN [phone]    PROGRESS NOTE:     Patient is a 89y old  Female who presents with a chief complaint of worsening swelling of Left face (07 Feb 2021 07:50)      SUBJECTIVE / OVERNIGHT EVENTS:    ADDITIONAL REVIEW OF SYSTEMS:    MEDICATIONS  (STANDING):  cefTRIAXone   IVPB 1000 milliGRAM(s) IV Intermittent every 24 hours  Dakins Solution - 1/4 Strength 1 Application(s) Topical daily  enoxaparin Injectable 30 milliGRAM(s) SubCutaneous daily  influenza   Vaccine 0.5 milliLiter(s) IntraMuscular once  lactated ringers. 1000 milliLiter(s) (50 mL/Hr) IV Continuous <Continuous>    MEDICATIONS  (PRN):      CAPILLARY BLOOD GLUCOSE        I&O's Summary    07 Feb 2021 07:01  -  08 Feb 2021 07:00  --------------------------------------------------------  IN: 700 mL / OUT: 500 mL / NET: 200 mL        PHYSICAL EXAM:  Vital Signs Last 24 Hrs  T(C): 36.7 (08 Feb 2021 05:54), Max: 36.8 (07 Feb 2021 08:30)  T(F): 98 (08 Feb 2021 05:54), Max: 98.2 (07 Feb 2021 08:30)  HR: 64 (08 Feb 2021 05:54) (62 - 66)  BP: 108/55 (08 Feb 2021 05:54) (98/58 - 113/54)  BP(mean): --  RR: 16 (08 Feb 2021 05:54) (14 - 17)  SpO2: 95% (08 Feb 2021 05:54) (93% - 100%)    CONSTITUTIONAL: NAD, well-developed  RESPIRATORY: Normal respiratory effort; lungs are clear to auscultation bilaterally  CARDIOVASCULAR: Regular rate and rhythm, normal S1 and S2, no murmur/rub/gallop; No lower extremity edema; Peripheral pulses are 2+ bilaterally  ABDOMEN: Nontender to palpation, normoactive bowel sounds, no rebound/guarding; No hepatosplenomegaly  MUSCLOSKELETAL: no clubbing or cyanosis of digits; no joint swelling or tenderness to palpation  PSYCH: A+O to person, place, and time; affect appropriate    LABS:                        9.8    100.92 )-----------( 213      ( 07 Feb 2021 07:22 )             33.2     02-07    141  |  108<H>  |  11  ----------------------------<  102<H>  3.5   |  22  |  1.06    Ca    9.0      07 Feb 2021 07:22  Phos  3.2     02-07  Mg     2.1     02-07    TPro  4.9<L>  /  Alb  3.0<L>  /  TBili  0.2  /  DBili  x   /  AST  6   /  ALT  <5  /  AlkPhos  141<H>  02-07                RADIOLOGY & ADDITIONAL TESTS:  Results Reviewed:   Imaging Personally Reviewed:  Electrocardiogram Personally Reviewed:    COORDINATION OF CARE:  Care Discussed with Consultants/Other Providers [Y/N]:  Prior or Outpatient Records Reviewed [Y/N]:   Nereida Robles, PGY-1  Internal Medicine  Pager: [phone]/JOANTHAN [phone]    PROGRESS NOTE:     Patient is a 89y old  Female who presents with a chief complaint of worsening swelling of Left face (07 Feb 2021 07:50)      SUBJECTIVE / OVERNIGHT EVENTS: No acute events overnight. Pt seen and examined at bedside. Denies fever, chills, nausea, vomiting, cp, or sob. AOx3, patient refusing further workup of facial lesion due to advanced age.     ADDITIONAL REVIEW OF SYSTEMS: Above plus denies pain, bleeding or worsening discharge of face lesion. Denies paresthesias, numbness of face.     MEDICATIONS  (STANDING):  cefTRIAXone   IVPB 1000 milliGRAM(s) IV Intermittent every 24 hours  Dakins Solution - 1/4 Strength 1 Application(s) Topical daily  enoxaparin Injectable 30 milliGRAM(s) SubCutaneous daily  influenza   Vaccine 0.5 milliLiter(s) IntraMuscular once  lactated ringers. 1000 milliLiter(s) (50 mL/Hr) IV Continuous <Continuous>    MEDICATIONS  (PRN):      CAPILLARY BLOOD GLUCOSE        I&O's Summary    07 Feb 2021 07:01  -  08 Feb 2021 07:00  --------------------------------------------------------  IN: 700 mL / OUT: 500 mL / NET: 200 mL        PHYSICAL EXAM:  Vital Signs Last 24 Hrs  T(C): 36.7 (08 Feb 2021 05:54), Max: 36.8 (07 Feb 2021 08:30)  T(F): 98 (08 Feb 2021 05:54), Max: 98.2 (07 Feb 2021 08:30)  HR: 64 (08 Feb 2021 05:54) (62 - 66)  BP: 108/55 (08 Feb 2021 05:54) (98/58 - 113/54)  BP(mean): --  RR: 16 (08 Feb 2021 05:54) (14 - 17)  SpO2: 95% (08 Feb 2021 05:54) (93% - 100%)    GENERAL: NAD, elderly female  HEAD: +Large fungating malodorous soft tissue mass localized to left cheek with brownish serous discharge w, doesn't cross midline, +non-tender, otherwise normocephalic and normal facies  EYES: EOMI, PERRLA, conjunctiva and sclera clear  ENMT: No tonsillar erythema, exudates, or enlargement; Moist mucous membranes  NECK: +Erythema of left neck. Supple, No JVD, Normal Thyroid  CHEST/LUNG: Decreased lung sounds on left lower lobe, otherwise clear to auscultation bilaterally; No rales, rhonchi, wheezing, or rubs  HEART: Regular rate and rhythm; S1 and S2; No murmurs, rubs, or gallops  ABDOMEN: +Splenomegaly. Soft, Nontender, Nondistended: Bowel sounds present  EXTREMITIES: 2+ Peripheral Pulses, No clubbing, cyanosis, or edema  LYMPH: +Left axillary LAD  SKIN: +Left shin necrotic lesion, left wrist mass with bandaging c/d/i      LABS:                        9.8    100.92 )-----------( 213      ( 07 Feb 2021 07:22 )             33.2     02-07    141  |  108<H>  |  11  ----------------------------<  102<H>  3.5   |  22  |  1.06    Ca    9.0      07 Feb 2021 07:22  Phos  3.2     02-07  Mg     2.1     02-07    TPro  4.9<L>  /  Alb  3.0<L>  /  TBili  0.2  /  DBili  x   /  AST  6   /  ALT  <5  /  AlkPhos  141<H>  02-07                RADIOLOGY & ADDITIONAL TESTS:  Results Reviewed:   Imaging Personally Reviewed:  Electrocardiogram Personally Reviewed:    COORDINATION OF CARE:  Care Discussed with Consultants/Other Providers [Y/N]:  Prior or Outpatient Records Reviewed [Y/N]:

## 2021-02-08 NOTE — PROGRESS NOTE ADULT - PROBLEM SELECTOR PLAN 6
Diet: Regular diet  DVT: Lovenox  Dispo: PT ramesh, GOC ongoing Diet: Regular diet  DVT: Lovenox  Dispo: PT recs rehab but pt refusing

## 2021-02-09 PROCEDURE — 99233 SBSQ HOSP IP/OBS HIGH 50: CPT | Mod: GC

## 2021-02-09 RX ADMIN — Medication 1 APPLICATION(S): at 12:38

## 2021-02-09 RX ADMIN — ENOXAPARIN SODIUM 30 MILLIGRAM(S): 100 INJECTION SUBCUTANEOUS at 10:21

## 2021-02-09 NOTE — PROGRESS NOTE ADULT - PROBLEM SELECTOR PLAN 6
Diet: Regular diet  DVT: Lovenox  Dispo: PT recs rehab but pt refusing Diet: Regular diet  DVT: Lovenox  Dispo: PT recs rehab but pt refusing. Family refusing to take patient home.

## 2021-02-09 NOTE — PROGRESS NOTE ADULT - PROBLEM SELECTOR PLAN 1
-Large fungating necrotic soft tissue mass on left face containing droplets of gas.  -CT HEAD showing no intracranial hemorrhage, edema or mass effect  -CT MAXILLOFACIAL BONES showing no associated bony destruction  -DDx malignancy (leukemia, T Cell lymphoma, mycosis fungoides, etc), infectious  -Denies exposure, recent trauma to the face  -Surgery Consult placed for resection evaluation  -Re: MRI, pt states that she does not want to work up this mass and does not want biopsy or surgical intervention for it. Reiterated sentiment today. Has full capacity to make this decision.  -Will discuss with patient's daughter regarding patient's decision -Large fungating necrotic soft tissue mass on left face containing droplets of gas.  -CT HEAD showing no intracranial hemorrhage, edema or mass effect  -CT MAXILLOFACIAL BONES showing no associated bony destruction  -DDx malignancy (leukemia, T Cell lymphoma, mycosis fungoides, etc), infectious  -Denies exposure, recent trauma to the face  -Re: MRI and Surgery, pt states that she does not want to work up this mass and does not want biopsy or surgical intervention for it. Reiterated sentiment again. Has full capacity to make this decision.  -Will discuss with patient's daughter regarding patient's decision

## 2021-02-09 NOTE — PROGRESS NOTE ADULT - SUBJECTIVE AND OBJECTIVE BOX
INTERVAL HPI/OVERNIGHT EVENTS:  Patient S&E at bedside. No complaints at this time.       VITAL SIGNS:  T(F): 98 (02-09-21 @ 12:01)  HR: 62 (02-09-21 @ 12:01)  BP: 111/57 (02-09-21 @ 12:01)  RR: 18 (02-09-21 @ 12:01)  SpO2: 94% (02-09-21 @ 12:01)  Wt(kg): --    PHYSICAL EXAM:    Constitutional: NAD  Eyes: EOMI, sclera non-icteric  Neck: supple,  Respiratory: no inc wob   Cardiovascular: RRR,   Gastrointestinal: soft, NTND, no masses palpable,   Extremities: SKIN: +Left shin necrotic lesion, left wrist mass with bandaging c/d/i. Large fungating mass on left cheek   Neurological: non-focal    MEDICATIONS  (STANDING):  Dakins Solution - 1/4 Strength 1 Application(s) Topical daily  enoxaparin Injectable 30 milliGRAM(s) SubCutaneous daily  influenza   Vaccine 0.5 milliLiter(s) IntraMuscular once    MEDICATIONS  (PRN):      LABS:  refused AM labs today     RADIOLOGY & ADDITIONAL TESTS:    TM Interpretation:   Flow Cytometry Final Report  ________________________________________________________________________  Specimen: Peripheral blood  Collected: 02/05/2021 10:24  Received: 02/08/2021 10:24  Processed:02/08/2021 10:30  Reported: 02/09/2021 12:52  Accession #: [id number]  Surgical Pathology Number:  ________________________________________________________________________  CLINICAL DATA: R/O AML/ALL/CLL    ________________________________________________________________________  ________________________________________________________________________  DIAGNOSIS:  Peripheral blood:       - Monotypic B-cells (1% of cells), positive for kappa, slightly dimmer CD19, CD20, CD5, FMC-7;  negative CD10, CD23, 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 Please see interpretation.    Case was discussed with Dr. Altamirano on 2/9/21.    INTERPRETATION:  MORPHOLOGY: 81% neutrophils, 2% basophils, 4% eosinophils, 11% lymphocytes and many degenerated  cells present    IMMUNOPHENOTYPE: Monotypic B-cells (1% of cells), positive for kappa, slightly dimmer CD19, CD20,  CD5, FMC-7; negative CD10, CD23.  Heterogeneous T-cells with normal CD4:CD8 ratio are present.  CD45/side scatter shows no significant blast population. There is no increase in CD34,  or  CD14 positive cells. Myeloid antigen pattern is slightly left-shifted with CD11b, CD13 and CD16.  Basophils are present by CD11b and CD13.    The findings are consistent with CD5 positive B-cell lymphoproliferative disorder (also positive  with FMC-7).  The differential diagnosis includes CLL (which may occasionally be FMC-7 positive),  mantle cell lineage, and marginal zone lineage (which may occasionally be CD5 positive).  Suggest  CLL FISH panel to rule out mantle cell lymphoma (includes FISH for t(11;14)) and to evaluate for  prognostic factors associated with CLL.    _____________________________________________________________________  Viability ................. 96 %    Values reported are based on the lymphocyte gate. (Bright CD45 positive; low side scatter, low  forward scatter).  15% of cells.    CD45 .......... 100 %  CD2 ........... 90 %  CD3 ........... 81 %  CD5 ........... 84 %  CD7 ........... 78 %  CD4 ........... 56 %  CD8 ........... 21 %  CD16 .......... 8 %  CD56........... 11 %  CD57 .......... 10 %  CD10 .......... <1  CD19 .......... 5 %  CD20 .......... 4 %  CD23 .......... 1 %  FMC-7 ......... 5 %  CD19/kappa ......... 4 %  CD19/lambda ........ 1 %  HLA-DR........ 36 %  CD38 .......... 16 %    Results reported are based on an open gate.    CD45 .......... 100 %  CD14 .......... 5 %  CD34 .......... <1   ......... <1  CD11b ......... 82 %  CD13 .......... 81 %  CD15 .......... 73 %  CD33 .......... 75 %        Verified By: Eden Guillen M.D., M.D.  (Electronic Signature)    This test was developed and its performance characteristics determined by the Flow Cytometry  Laboratory at [facility]. It has not been cleared or approved by the U.S. Food andDrug  Administration.  The FDA has determined that such clearance or approval is not necessary. This test is used for  clinical purposes. It should not be regarded as investigational or for research. This laboratory is  certified under the Clinical Laboratory Improvement Amendment of 1988 ("CLIA") as qualified to  perform high complexity clinical testing. (02.05.21 @ 10:24)

## 2021-02-09 NOTE — PROGRESS NOTE ADULT - PROBLEM SELECTOR PLAN 4
UA positive for bacteria with LE  -Now endorsing dysuria with frequency  -Ceftriaxone x 3 days (2/6-2/8)  -Monitor for increasing temp

## 2021-02-09 NOTE — PROGRESS NOTE ADULT - SUBJECTIVE AND OBJECTIVE BOX
Nereida Robles, PGY-1  Internal Medicine  Pager: [phone]/[facility] [phone]    PROGRESS NOTE:     Patient is a 89y old  Female who presents with a chief complaint of worsening swelling of Left face (08 Feb 2021 07:05)      SUBJECTIVE / OVERNIGHT EVENTS:    ADDITIONAL REVIEW OF SYSTEMS:    MEDICATIONS  (STANDING):  Dakins Solution - 1/4 Strength 1 Application(s) Topical daily  enoxaparin Injectable 30 milliGRAM(s) SubCutaneous daily  influenza   Vaccine 0.5 milliLiter(s) IntraMuscular once    MEDICATIONS  (PRN):      CAPILLARY BLOOD GLUCOSE        I&O's Summary    07 Feb 2021 07:01  -  08 Feb 2021 07:00  --------------------------------------------------------  IN: 700 mL / OUT: 500 mL / NET: 200 mL    08 Feb 2021 07:01  -  09 Feb 2021 06:57  --------------------------------------------------------  IN: 120 mL / OUT: 200 mL / NET: -80 mL        PHYSICAL EXAM:  Vital Signs Last 24 Hrs  T(C): 36.8 (08 Feb 2021 21:51), Max: 36.8 (08 Feb 2021 21:51)  T(F): 98.2 (08 Feb 2021 21:51), Max: 98.2 (08 Feb 2021 21:51)  HR: 67 (08 Feb 2021 21:51) (63 - 67)  BP: 131/60 (08 Feb 2021 21:51) (104/56 - 131/60)  BP(mean): --  RR: 17 (08 Feb 2021 21:51) (17 - 17)  SpO2: 94% (08 Feb 2021 21:51) (94% - 97%)    CONSTITUTIONAL: NAD, well-developed  RESPIRATORY: Normal respiratory effort; lungs are clear to auscultation bilaterally  CARDIOVASCULAR: Regular rate and rhythm, normal S1 and S2, no murmur/rub/gallop; No lower extremity edema; Peripheral pulses are 2+ bilaterally  ABDOMEN: Nontender to palpation, normoactive bowel sounds, no rebound/guarding; No hepatosplenomegaly  MUSCLOSKELETAL: no clubbing or cyanosis of digits; no joint swelling or tenderness to palpation  PSYCH: A+O to person, place, and time; affect appropriate    LABS:                        9.8    100.92 )-----------( 213      ( 07 Feb 2021 07:22 )             33.2     02-07    141  |  108<H>  |  11  ----------------------------<  102<H>  3.5   |  22  |  1.06    Ca    9.0      07 Feb 2021 07:22  Phos  3.2     02-07  Mg     2.1     02-07    TPro  4.9<L>  /  Alb  3.0<L>  /  TBili  0.2  /  DBili  x   /  AST  6   /  ALT  <5  /  AlkPhos  141<H>  02-07                RADIOLOGY & ADDITIONAL TESTS:  Results Reviewed:   Imaging Personally Reviewed:  Electrocardiogram Personally Reviewed:    COORDINATION OF CARE:  Care Discussed with Consultants/Other Providers [Y/N]:  Prior or Outpatient Records Reviewed [Y/N]:   Nereida Robles, PGY-1  Internal Medicine  Pager: [phone]/ABELARDOJ [phone]    PROGRESS NOTE:     Patient is a 89y old  Female who presents with a chief complaint of worsening swelling of Left face (08 Feb 2021 07:05)      SUBJECTIVE / OVERNIGHT EVENTS: No acute events overnight. Pt seen and examined at bedside. Denies fever, chills, nausea, vomiting, cp, or sob. Patient refusing lab work today. Patient also refusing D/C to rehab.     ADDITIONAL REVIEW OF SYSTEMS: Above    MEDICATIONS  (STANDING):  Dakins Solution - 1/4 Strength 1 Application(s) Topical daily  enoxaparin Injectable 30 milliGRAM(s) SubCutaneous daily  influenza   Vaccine 0.5 milliLiter(s) IntraMuscular once    MEDICATIONS  (PRN):      CAPILLARY BLOOD GLUCOSE        I&O's Summary    07 Feb 2021 07:01  -  08 Feb 2021 07:00  --------------------------------------------------------  IN: 700 mL / OUT: 500 mL / NET: 200 mL    08 Feb 2021 07:01  -  09 Feb 2021 06:57  --------------------------------------------------------  IN: 120 mL / OUT: 200 mL / NET: -80 mL        PHYSICAL EXAM:  Vital Signs Last 24 Hrs  T(C): 36.8 (08 Feb 2021 21:51), Max: 36.8 (08 Feb 2021 21:51)  T(F): 98.2 (08 Feb 2021 21:51), Max: 98.2 (08 Feb 2021 21:51)  HR: 67 (08 Feb 2021 21:51) (63 - 67)  BP: 131/60 (08 Feb 2021 21:51) (104/56 - 131/60)  BP(mean): --  RR: 17 (08 Feb 2021 21:51) (17 - 17)  SpO2: 94% (08 Feb 2021 21:51) (94% - 97%)    GENERAL: NAD, elderly female  HEAD: +Large fungating malodorous soft tissue mass localized to left cheek with brownish serous discharge w, doesn't cross midline, +non-tender, otherwise normocephalic and normal facies  EYES: EOMI, PERRLA, conjunctiva and sclera clear  ENMT: No tonsillar erythema, exudates, or enlargement; Moist mucous membranes  NECK: +Erythema of left neck. Supple, No JVD, Normal Thyroid  CHEST/LUNG: Decreased lung sounds on left lower lobe, otherwise clear to auscultation bilaterally; No rales, rhonchi, wheezing, or rubs  HEART: Regular rate and rhythm; S1 and S2; No murmurs, rubs, or gallops  ABDOMEN: +Splenomegaly. Soft, Nontender, Nondistended: Bowel sounds present  EXTREMITIES: 2+ Peripheral Pulses, No clubbing, cyanosis, or edema  LYMPH: +Left axillary LAD  SKIN: +Left shin necrotic lesion, left wrist mass with bandaging c/d/i   PSYCH: AOx4, affect appropriate    LABS:                        9.8    100.92 )-----------( 213      ( 07 Feb 2021 07:22 )             33.2     02-07    141  |  108<H>  |  11  ----------------------------<  102<H>  3.5   |  22  |  1.06    Ca    9.0      07 Feb 2021 07:22  Phos  3.2     02-07  Mg     2.1     02-07    TPro  4.9<L>  /  Alb  3.0<L>  /  TBili  0.2  /  DBili  x   /  AST  6   /  ALT  <5  /  AlkPhos  141<H>  02-07                RADIOLOGY & ADDITIONAL TESTS:  Results Reviewed:   Imaging Personally Reviewed:  Electrocardiogram Personally Reviewed:    COORDINATION OF CARE:  Care Discussed with Consultants/Other Providers [Y/N]:  Prior or Outpatient Records Reviewed [Y/N]:

## 2021-02-09 NOTE — PROGRESS NOTE ADULT - PROBLEM SELECTOR PLAN 2
WBC of 110.56 with lymphocytic predominance with elevated CRP, lactate  DDx malignancy (leukemia ie. CLL, lymphoma), less likely infectious  -CT A/P showing splenomegaly, LAD in left axilla   -LDH, uric acid, CMP for TLS eval daily  -Peripheral Smear  -Coags, T/S  -F/u flow cytometry  -BCx2 NGTD  -UCx probable contaminant  -As above, patient does not want to LN biopsy. Informed of high likelihood of cancer and understands. Has full capacity to make this decision.   -From GOC conversation: Unable to decide on code status after several GOC convo. Patient does not want to discuss code status further.  -Heme-Onc consulted WBC of 110.56 with lymphocytic predominance with elevated CRP, lactate  DDx malignancy (leukemia ie. CLL, lymphoma), less likely infectious  -CT A/P showing splenomegaly, LAD in left axilla   -Refusing lab work  -LDH, uric acid, CMP for TLS eval daily  -Peripheral Smear  -Coags, T/S  -F/u flow cytometry  -BCx2 NGTD  -UCx probable contaminant  -As above, patient does not want to LN biopsy. Informed of high likelihood of cancer and understands. Has full capacity to make this decision.   -From GOC conversation: Unable to decide on code status after several GOC convo. Patient does not want to discuss code status further.  -Heme-Onc consulted

## 2021-02-10 LAB
ALBUMIN SERPL ELPH-MCNC: 3 G/DL — LOW (ref 3.3–5)
ALP SERPL-CCNC: 141 U/L — HIGH (ref 40–120)
ALT FLD-CCNC: 8 U/L — SIGNIFICANT CHANGE UP (ref 4–33)
ANION GAP SERPL CALC-SCNC: 11 MMOL/L — SIGNIFICANT CHANGE UP (ref 7–14)
AST SERPL-CCNC: 8 U/L — SIGNIFICANT CHANGE UP (ref 4–32)
BASOPHILS # BLD AUTO: 0.05 K/UL — SIGNIFICANT CHANGE UP (ref 0–0.2)
BASOPHILS NFR BLD AUTO: 0.1 % — SIGNIFICANT CHANGE UP (ref 0–2)
BILIRUB SERPL-MCNC: 0.2 MG/DL — SIGNIFICANT CHANGE UP (ref 0.2–1.2)
BUN SERPL-MCNC: 12 MG/DL — SIGNIFICANT CHANGE UP (ref 7–23)
CALCIUM SERPL-MCNC: 9 MG/DL — SIGNIFICANT CHANGE UP (ref 8.4–10.5)
CHLORIDE SERPL-SCNC: 108 MMOL/L — HIGH (ref 98–107)
CO2 SERPL-SCNC: 23 MMOL/L — SIGNIFICANT CHANGE UP (ref 22–31)
CREAT SERPL-MCNC: 1.06 MG/DL — SIGNIFICANT CHANGE UP (ref 0.5–1.3)
CULTURE RESULTS: SIGNIFICANT CHANGE UP
CULTURE RESULTS: SIGNIFICANT CHANGE UP
EOSINOPHIL # BLD AUTO: 0.3 K/UL — SIGNIFICANT CHANGE UP (ref 0–0.5)
EOSINOPHIL NFR BLD AUTO: 0.3 % — SIGNIFICANT CHANGE UP (ref 0–6)
GLUCOSE SERPL-MCNC: 105 MG/DL — HIGH (ref 70–99)
HCT VFR BLD CALC: 34.2 % — LOW (ref 34.5–45)
HGB BLD-MCNC: 10.1 G/DL — LOW (ref 11.5–15.5)
IANC: 38.83 K/UL — HIGH (ref 1.5–8.5)
IMM GRANULOCYTES NFR BLD AUTO: 2.1 % — HIGH (ref 0–1.5)
LACTATE BLDV-MCNC: 1.3 MMOL/L — SIGNIFICANT CHANGE UP (ref 0.5–2)
LDH SERPL L TO P-CCNC: 113 U/L — LOW (ref 135–225)
LYMPHOCYTES # BLD AUTO: 54.75 K/UL — HIGH (ref 1–3.3)
LYMPHOCYTES # BLD AUTO: 56.1 % — HIGH (ref 13–44)
MAGNESIUM SERPL-MCNC: 2.2 MG/DL — SIGNIFICANT CHANGE UP (ref 1.6–2.6)
MCHC RBC-ENTMCNC: 26.8 PG — LOW (ref 27–34)
MCHC RBC-ENTMCNC: 29.5 GM/DL — LOW (ref 32–36)
MCV RBC AUTO: 90.7 FL — SIGNIFICANT CHANGE UP (ref 80–100)
MONOCYTES # BLD AUTO: 1.66 K/UL — HIGH (ref 0–0.9)
MONOCYTES NFR BLD AUTO: 1.7 % — LOW (ref 2–14)
NEUTROPHILS # BLD AUTO: 38.83 K/UL — HIGH (ref 1.8–7.4)
NEUTROPHILS NFR BLD AUTO: 39.7 % — LOW (ref 43–77)
NRBC # BLD: 0 /100 WBCS — SIGNIFICANT CHANGE UP
NRBC # FLD: 0 K/UL — SIGNIFICANT CHANGE UP
PHOSPHATE SERPL-MCNC: 3.2 MG/DL — SIGNIFICANT CHANGE UP (ref 2.5–4.5)
PLATELET # BLD AUTO: 209 K/UL — SIGNIFICANT CHANGE UP (ref 150–400)
POTASSIUM SERPL-MCNC: 3.6 MMOL/L — SIGNIFICANT CHANGE UP (ref 3.5–5.3)
POTASSIUM SERPL-SCNC: 3.6 MMOL/L — SIGNIFICANT CHANGE UP (ref 3.5–5.3)
PROT SERPL-MCNC: 5.2 G/DL — LOW (ref 6–8.3)
RBC # BLD: 3.77 M/UL — LOW (ref 3.8–5.2)
RBC # FLD: 14.3 % — SIGNIFICANT CHANGE UP (ref 10.3–14.5)
SODIUM SERPL-SCNC: 142 MMOL/L — SIGNIFICANT CHANGE UP (ref 135–145)
SPECIMEN SOURCE: SIGNIFICANT CHANGE UP
SPECIMEN SOURCE: SIGNIFICANT CHANGE UP
URATE SERPL-MCNC: 8.6 MG/DL — HIGH (ref 2.5–7)
WBC # BLD: 97.65 K/UL — CRITICAL HIGH (ref 3.8–10.5)
WBC # FLD AUTO: 97.65 K/UL — CRITICAL HIGH (ref 3.8–10.5)

## 2021-02-10 PROCEDURE — 99233 SBSQ HOSP IP/OBS HIGH 50: CPT | Mod: GC

## 2021-02-10 RX ORDER — POTASSIUM CHLORIDE 20 MEQ
40 PACKET (EA) ORAL ONCE
Refills: 0 | Status: COMPLETED | OUTPATIENT
Start: 2021-02-10 | End: 2021-02-10

## 2021-02-10 RX ADMIN — Medication 40 MILLIEQUIVALENT(S): at 18:22

## 2021-02-10 RX ADMIN — ENOXAPARIN SODIUM 30 MILLIGRAM(S): 100 INJECTION SUBCUTANEOUS at 12:10

## 2021-02-10 RX ADMIN — Medication 1 APPLICATION(S): at 15:13

## 2021-02-10 NOTE — PROGRESS NOTE ADULT - PROBLEM SELECTOR PLAN 3
Left necrotic wound on left shin with soft tissue wound on left wrist  -XR confirming heterogenous soft tissue masses of both  -Wound care recs in Left necrotic wound on left shin with soft tissue wound on left wrist  -XR confirming heterogenous soft tissue masses of both  -Wound care recs in  -Wound care outpt

## 2021-02-10 NOTE — PROGRESS NOTE ADULT - SUBJECTIVE AND OBJECTIVE BOX
Nereida Robles, PGY-1  Internal Medicine  Pager: [phone]/AINSLEY [phone]    PROGRESS NOTE:     Patient is a 89y old  Female who presents with a chief complaint of worsening swelling of Left face (09 Feb 2021 21:21)      SUBJECTIVE / OVERNIGHT EVENTS:    ADDITIONAL REVIEW OF SYSTEMS:    MEDICATIONS  (STANDING):  Dakins Solution - 1/4 Strength 1 Application(s) Topical daily  enoxaparin Injectable 30 milliGRAM(s) SubCutaneous daily  influenza   Vaccine 0.5 milliLiter(s) IntraMuscular once    MEDICATIONS  (PRN):      CAPILLARY BLOOD GLUCOSE        I&O's Summary    09 Feb 2021 07:01  -  10 Feb 2021 07:00  --------------------------------------------------------  IN: 590 mL / OUT: 0 mL / NET: 590 mL        PHYSICAL EXAM:  Vital Signs Last 24 Hrs  T(C): 36.7 (09 Feb 2021 21:46), Max: 36.7 (09 Feb 2021 12:01)  T(F): 98.1 (09 Feb 2021 21:46), Max: 98.1 (09 Feb 2021 21:46)  HR: 68 (09 Feb 2021 21:46) (62 - 68)  BP: 126/67 (09 Feb 2021 21:46) (111/57 - 126/67)  BP(mean): --  RR: 18 (09 Feb 2021 21:46) (18 - 18)  SpO2: 95% (09 Feb 2021 21:46) (94% - 95%)    CONSTITUTIONAL: NAD, well-developed  RESPIRATORY: Normal respiratory effort; lungs are clear to auscultation bilaterally  CARDIOVASCULAR: Regular rate and rhythm, normal S1 and S2, no murmur/rub/gallop; No lower extremity edema; Peripheral pulses are 2+ bilaterally  ABDOMEN: Nontender to palpation, normoactive bowel sounds, no rebound/guarding; No hepatosplenomegaly  MUSCLOSKELETAL: no clubbing or cyanosis of digits; no joint swelling or tenderness to palpation  PSYCH: A+O to person, place, and time; affect appropriate    LABS:                      RADIOLOGY & ADDITIONAL TESTS:  Results Reviewed:   Imaging Personally Reviewed:  Electrocardiogram Personally Reviewed:    COORDINATION OF CARE:  Care Discussed with Consultants/Other Providers [Y/N]:  Prior or Outpatient Records Reviewed [Y/N]:   Nereida Robles, PGY-1  Internal Medicine  Pager: [phone]/[place] [phone]    PROGRESS NOTE:     Patient is a 89y old  Female who presents with a chief complaint of worsening swelling of Left face (09 Feb 2021 21:21)      SUBJECTIVE / OVERNIGHT EVENTS: No acute events overnight. Pt seen and examined at bedside. Denies fever, chills, nausea, vomiting, cp, or sob.     ADDITIONAL REVIEW OF SYSTEMS: ABove    MEDICATIONS  (STANDING):  Dakins Solution - 1/4 Strength 1 Application(s) Topical daily  enoxaparin Injectable 30 milliGRAM(s) SubCutaneous daily  influenza   Vaccine 0.5 milliLiter(s) IntraMuscular once    MEDICATIONS  (PRN):      CAPILLARY BLOOD GLUCOSE        I&O's Summary    09 Feb 2021 07:01  -  10 Feb 2021 07:00  --------------------------------------------------------  IN: 590 mL / OUT: 0 mL / NET: 590 mL        PHYSICAL EXAM:  Vital Signs Last 24 Hrs  T(C): 36.7 (09 Feb 2021 21:46), Max: 36.7 (09 Feb 2021 12:01)  T(F): 98.1 (09 Feb 2021 21:46), Max: 98.1 (09 Feb 2021 21:46)  HR: 68 (09 Feb 2021 21:46) (62 - 68)  BP: 126/67 (09 Feb 2021 21:46) (111/57 - 126/67)  BP(mean): --  RR: 18 (09 Feb 2021 21:46) (18 - 18)  SpO2: 95% (09 Feb 2021 21:46) (94% - 95%)    GENERAL: NAD, elderly female  HEAD: +Large fungating malodorous soft tissue mass localized to left cheek with brownish serous discharge w, doesn't cross midline, +non-tender, otherwise normocephalic and normal facies  EYES: EOMI, PERRLA, conjunctiva and sclera clear  ENMT: No tonsillar erythema, exudates, or enlargement; Moist mucous membranes  NECK: Supple, No JVD, Normal Thyroid  CHEST/LUNG: Decreased lung sounds on left lower lobe, otherwise clear to auscultation bilaterally; No rales, rhonchi, wheezing, or rubs  HEART: Regular rate and rhythm; S1 and S2; No murmurs, rubs, or gallops  ABDOMEN: +Splenomegaly. Soft, Nontender, Nondistended: Bowel sounds present  EXTREMITIES: 2+ Peripheral Pulses, No clubbing, cyanosis, or edema  LYMPH: +Left axillary LAD  SKIN: +Left shin necrotic lesion, left wrist mass with bandaging c/d/i   PSYCH: AOx4, affect appropriate    LABS:                      RADIOLOGY & ADDITIONAL TESTS:  Results Reviewed:   Imaging Personally Reviewed:  Electrocardiogram Personally Reviewed:    COORDINATION OF CARE:  Care Discussed with Consultants/Other Providers [Y/N]:  Prior or Outpatient Records Reviewed [Y/N]:

## 2021-02-10 NOTE — PROGRESS NOTE ADULT - PROBLEM SELECTOR PLAN 1
-Large fungating necrotic soft tissue mass on left face containing droplets of gas.  -CT HEAD showing no intracranial hemorrhage, edema or mass effect  -CT MAXILLOFACIAL BONES showing no associated bony destruction  -DDx malignancy (leukemia, T Cell lymphoma, mycosis fungoides, etc), infectious  -Denies exposure, recent trauma to the face  -Re: MRI and Surgery, pt states that she does not want to work up this mass and does not want biopsy or surgical intervention for it. Reiterated sentiment again. Has full capacity to make this decision.  -Will discuss with patient's daughter regarding patient's decision -F/u Hospice care referral  -Large fungating necrotic soft tissue mass on left face containing droplets of gas.  -CT HEAD showing no intracranial hemorrhage, edema or mass effect  -CT MAXILLOFACIAL BONES showing no associated bony destruction  -DDx malignancy (leukemia, T Cell lymphoma, mycosis fungoides, etc), infectious  -Denies exposure, recent trauma to the face  -Re: MRI and Surgery, pt states that she does not want to work up this mass and does not want biopsy or surgical intervention for it. Reiterated sentiment again. Has full capacity to make this decision.  -Will discuss with patient's daughter regarding patient's decision

## 2021-02-10 NOTE — PROGRESS NOTE ADULT - PROBLEM SELECTOR PLAN 2
WBC of 110.56 with lymphocytic predominance with elevated CRP, lactate  DDx malignancy (leukemia ie. CLL, lymphoma), less likely infectious  -CT A/P showing splenomegaly, LAD in left axilla   -Refusing lab work  -LDH, uric acid, CMP for TLS eval daily  -Peripheral Smear  -Coags, T/S  -F/u flow cytometry  -BCx2 NGTD  -UCx probable contaminant  -As above, patient does not want to LN biopsy. Informed of high likelihood of cancer and understands. Has full capacity to make this decision.   -From GOC conversation: Unable to decide on code status after several GOC convo. Patient does not want to discuss code status further.  -Heme-Onc consulted WBC of 110.56 with lymphocytic predominance with elevated CRP, lactate  DDx malignancy (leukemia ie. 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 Informed of high likelihood of cancer and understands. Has full capacity to make this decision.   -From GOC conversation: Unable to decide on code status after several GOC convo. Patient does not want to discuss code status further.

## 2021-02-10 NOTE — PROGRESS NOTE ADULT - PROBLEM SELECTOR PLAN 4
UA positive for bacteria with LE  -Now endorsing dysuria with frequency  -Ceftriaxone x 3 days (2/6-2/8)  -Monitor for increasing temp UA positive for bacteria with LE  -Previously endorsing dysuria with frequency, improved  -Ceftriaxone x 3 days (2/6-2/8)  -Monitor for increasing temp

## 2021-02-11 ENCOUNTER — TRANSCRIPTION ENCOUNTER (OUTPATIENT)
Age: 86
End: 2021-02-11

## 2021-02-11 PROCEDURE — 99232 SBSQ HOSP IP/OBS MODERATE 35: CPT | Mod: GC

## 2021-02-11 RX ORDER — SODIUM HYPOCHLORITE 0.125 %
1 SOLUTION, NON-ORAL MISCELLANEOUS
Qty: 0 | Refills: 0 | DISCHARGE
Start: 2021-02-11

## 2021-02-11 RX ADMIN — ENOXAPARIN SODIUM 30 MILLIGRAM(S): 100 INJECTION SUBCUTANEOUS at 11:30

## 2021-02-11 RX ADMIN — Medication 1 APPLICATION(S): at 11:30

## 2021-02-11 NOTE — PROGRESS NOTE ADULT - PROBLEM SELECTOR PLAN 3
Left necrotic wound on left shin with soft tissue wound on left wrist  -XR confirming heterogenous soft tissue masses of both  -Wound care recs in  -Wound care outpt

## 2021-02-11 NOTE — DISCHARGE NOTE PROVIDER - NSDCCPCAREPLAN_GEN_ALL_CORE_FT
PRINCIPAL DISCHARGE DIAGNOSIS  Diagnosis: Facial mass  Assessment and Plan of Treatment: You were admitted to the hospital with a large facial mass, left wrist mass and necrotic lesion of the left shin. Your lab results show an elevated white blood cell count of 110 with mostly lymphocytes, concerning for leukemia or lymphoma. 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 However, we can not fully confirm this diagnosis without a tissue biopsy. You decided to not pursue any more diagnostic work-up, including biopsy, or treatment. As a result, we are referring you to home hospice.   If you decide that you would like to continue diagnostic testing or pursue treatment, you are welcome to make an appointment with the [facility]. Please follow-up with the Wound Care center for further treatment of your wounds. If you are unable to breathe, open your eyes, swallow, or have any shortness of breath or chest pain, please go to the nearest emergency room immediately.

## 2021-02-11 NOTE — DISCHARGE NOTE PROVIDER - HOSPITAL COURSE
Patient is a 89 y F with no PMHx, not seen by a physician for over 10 years, who presents for increasing growth of a fungating necrotic mass of the left face for the past 3 months. Patient also presented with soft tissue mass on left wrist and necrotic skin wound on left shin. Per patient's daughter, mass of the right face, soft tissue mass of left wrist and necrotic left shin and has been present for 2-3 years. As of November, face lesion and wrist lesion were 1-2 quarters sized in November 2020, and have since rapidly grown in size in the last 3 months. Labs were significant for leukocytosis of110.56 with lymphocytic predominance with elevated CRP, lactate   Patient is a 89 y F with no PMHx, not seen by a physician for over 10 years, who presents for increasing growth of a fungating necrotic mass of the left face for the past 3 months. Patient also presented with soft tissue mass on left wrist and necrotic skin wound on left shin. Per patient's daughter, mass of the right face, soft tissue mass of left wrist and necrotic left shin and has been present for 2-3 years. As of November, face lesion and wrist lesion were 1-2 quarters sized in November 2020, and have since rapidly grown in size in the last 3 months. Labs were significant for leukocytosis of110.56 with lymphocytic predominance with CRP of 54 and lactate 1.3. Tumor lysis labs checked daily: elevated uric acid and LDH with electrolytes (K, Ca, PO4) wnl. Patient given IV bolus and maintenance fluids. Patient initially interested in work-up and removal of masses. Flow cytometry showed CD5 positive B cell lymphoproliferative disorder. Hematology consulted for concern for CLL, then indolent cutaneous marginal zone lymphoma following flow cytometry. Patient then declined further workup of etiology of skin masses (biopsy of skin, lymph nodes and facial mass offered as options). General Surgery consulted for possible resection of facial mass and L wrist mass. Patient declined further imaging (MRI) to see depth of tissue invasion of facial mass and ultimately declined any surgical intervention on either mass, citing advanced age and concern of surgical risk. Patient given option to follow-up with [facility] outpatient if she decides to pursue treatment. Wound care consulted for large fungating masses and chronic left shin wound. Wound care recommendations applied and patient advised to follo-up outpatient. Given patient's advanced age and decision to not pursue treatment, referral placed for home hospice.     Also during hospitalization, UA positive for bacteria, LE and nitirites. Patient endorsed increased urinary frequency and dysuria. Treated with Ceftriaxone x 3 days for UTI. Noted to have prolapse uterus on exam. GYN consulted. Patient declined evaluation by GYN and advised to follow up outpatient.     Patient hemodynamically stable and prepared for discharge.

## 2021-02-11 NOTE — DISCHARGE NOTE PROVIDER - NSFOLLOWUPCLINICS_GEN_ALL_ED_FT
[facility]  OB/GYN & Surg  [address] [place] [id number]  Phone: [phone]  Fax:   Follow Up Time: Routine    [facility]  Hematology/Oncology  [address] [place] [id number]  Phone: [phone]  Fax:   Follow Up Time: Routine

## 2021-02-11 NOTE — DIETITIAN INITIAL EVALUATION ADULT. - OTHER INFO
Pt states her PO intake is suboptimal due to dislike of hospital foods. Denies any GI issues (nausea/vomiting/diarrhea/constipation.) Denies any chewing or swallowing difficulties at this time. NKFA. Unable to state UBW. Food preferences obtained. Pt encouraged to drink Ensure supplement.

## 2021-02-11 NOTE — DIETITIAN INITIAL EVALUATION ADULT. - PROBLEM SELECTOR PLAN 1
[facility]  Discharge Summary      Patient Name: Isabel Dennis  MRN: [id number]  Admission Date: 6/1/2017  Hospital Length of Stay: 6 days  Discharge Date and Time: 6/7/2017  8:40 AM  Attending Physician: No att. providers found   Discharging Provider: Avelina Huang NP  Primary Care Provider: Sadia De La Rosa MD    HPI:   Patient is a 61 y.o. female who has a past medical history of Anxiety; Arthritis; DDD; Depression; Diabetes mellitus type 2; Diverticulosis; GERD; hyperlipidemia presented with chronic right knee pain due to osteoarthritis. She has failed non surgical treatment and elected to undergo definitive surgical treatment. She underwent right total knee arthroplasty on 5/31/17. Patient tolerated procedure well with no significant post operative complications. Patient has been working with PT/OT who recommend SNF for further balance/mobility training. Patient lives at home with her  in single story home and prior to surgery was independent with ADL's. Patient complains of severe pain that is not controlled with the pain medications. She has been in pain since surgery. She has worked with therapy which has exacerbated her pain. She is tolerating diet without difficulties. Patient currently denies any fever/chills, chest pain, dyspnea, weakness, numbness, abdominal pain, nausea/vomiting, dysuria/hematuria, or weight loss.     Surgery: right total knee arthoplasty on 5/31/17      Hospital Course:   6/1/17: patient admitted to snf for ongoing therapy following a hospitalization for right total knee arthoplasty.  6/5/17: upon entering the room patient demanding to leave, patient screaming and cursing during conversation. Explained to the patient that I will get with therapy to determine safety level if she was discharge. Discussed the difference between being discharged and leaving AMA. Spoke with therapy, therapy feels that patient's safety will be fine inside the home but she  "hasn't been evaluated with stairs. Patient has 6 steps to get into her home. Spoke with patient about needing her to participate in PT so she could work with stairs. Patient became irate yelling and screaming "I'm not going to do what you want me to do. I'm going to do what I want to do and that is leave and not do stairs." Again explained the difference between being discharged and leaving AMA. Patient refusing to continue conversation and demanding I leave the room. I was notified later in the day that the patient will work with PT in the afternoon after her pain medication. I was notified by therapy that patient did perform steps and is safe to go home. Patient unable to have a ride home until tomorrow.  6/6/17: patient's family refusing to pick patient up until tomorrow. C/o constipation requesting brown bomb only.  6/7/17: family picked patient up early this am for discharge       Significant Diagnostic Studies:    Recent Labs  Lab 05/31/17  0944 06/01/17  0944 06/05/17  0438   WBC 4.30  --  5.30   HGB 12.8 9.7* 8.2*   HCT 41.4  --  27.8*     --  327         Recent Labs  Lab 05/31/17  1340 06/05/17  0438    139   K 4.7 4.0    103   CO2 24 27   BUN 15 12   CREATININE 0.8 0.7   CALCIUM 9.1 9.3     Lab Results   Component Value Date    LABPROT 10.4 05/17/2017    ALBUMIN 4.3 11/01/2016     Lab Results   Component Value Date    CALCIUM 9.3 06/05/2017    PHOS 4.3 06/05/2017   Results for IBRAHIMA NIEVES (MRN [id number]) as of 6/7/2017 09:01   Ref. Range 6/5/2017 04:38   Magnesium Latest Ref Range: 1.6 - 2.6 mg/dL 1.9     Final Active Diagnoses:    Diagnosis Date Noted POA    PRINCIPAL PROBLEM:  Primary osteoarthritis of right knee [M17.11] 05/31/2017 Yes    Status post total right knee replacement 5/31/2017 [Z96.651] 05/31/2017 Not Applicable    Edema [R60.9] 05/17/2017 Yes    Patient is Religion [Z78.9] 05/17/2017 Yes    Morbid obesity [E66.01] 02/18/2014 Yes    Allergic rhinitis, " seasonal [J30.2] 01/08/2013 Yes    GERD (gastroesophageal reflux disease) [K21.9] 01/08/2013 Yes    Diabetes mellitus, type 2 [E11.9] 01/08/2013 Yes      Problems Resolved During this Admission:    Diagnosis Date Noted Date Resolved POA      * Primary osteoarthritis of right knee    -Status post total right knee replacement 5/31/2017  -Surgical wound to be managed by ortho NP while at SNF  -Continue with current pain control with morphine 12hr, soma, hydrocodone-acetaminophen  -Continue bowel regimen for opioid induced constipation: miralax and senokot-s  -Continue with PT/OT for gait training and strengthening and restoration of ADL's: WBAT per ortho   -Fall precautions.   -Continue DVT prophylaxis with ASA 325mg bid        Status post total right knee replacement 5/31/2017    Plan as above.        Edema    -Low Na diet  -ANTHONY hose  -Leg elevation  -[facility] care         Patient is Taoism    -Continue to monitor H/H.   -Home health to collect labs on Thursday        Morbid obesity    -Low fat diet and gradual exercise.         Allergic rhinitis, seasonal    -Continue Flonase to treat.         Diabetes mellitus, type 2    -Not treatment at home.  -Continue ADA diet  -refusing accu checks         PT note, UDAY Brooks, PT 6/5/17  General Precautions: Standard, fall  Orthopedic Precautions: RLE weight bearing as tolerated   Braces: N/A  Functional Status:  MDS G  Transfer Functional Status: mod(I) - (I)  Walk in Room Functional Status: S-SBA  Moving from seated to standing position: Not steady, but able to stabilize without staff assistance  Walking (with assistive device if used): Not steady, but able to stabilize without staff assistance  Turning around and facing the opposite direction while walking: Not steady, but able to stabilize without staff assistance  Moving on and off the toilet: Not steady, but able to stabilize without staff assistance  Surface-to-surface transfer (transfer between bed and chair  or wheelchair): Not steady, but able to stabilize without staff assistance  RANDALL GG  Does the resident walk?: Yes --> Continue to Walk 50 feet with two turns assessment  Walk 50 feet with two turns Performance: Patient refused   Bed Mobility:  Supine>Sit: mod I  Transfers:  Sit<>Stand: mod I from bed, toilet, and wheelchair  Stand Pivot Transfer: mod I using RW  Gait:  Amb 10 feet in room with RW and supervision with wide ALE, decreased step length, and antalgic gait   Advanced Gait:  Stairs: 1x4 using bilateral rail with SBA, 1x4 using unilateral rail including descending backward with SBA   Patient left standing at bedside with pt request to leave her where she was and insisting that she was fine with call button in reach  Discharge recommendations: home with home health     OT tyler, MATT Sen 6/6/17  Functional Status:  MDS G  Bed Mobility Functional Status: mod(I) - (I) (supine to and from sit-managed polar ice hookup)  Transfer Functional Status: mod(I) - (I) (bed and standard toilet with Rw)  Walk in Room Functional Status: mod(I) - (I) (in room and bathroom with RW)  Toilet Use Functional Status: mod(I) - (I) (standard commode)  Personal Hygiene Functional Status: mod(I) - (I) (standing at sink)  Moving from seated to standing position: Not steady, but able to stabilize without staff assistance  Walking (with assistive device if used): Not steady, but able to stabilize without staff assistance  Turning around and facing the opposite direction while walking: Not steady, but able to stabilize without staff assistance  Moving on and off the toilet: Not steady, but able to stabilize without staff assistance  Surface-to-surface transfer (transfer between bed and chair or wheelchair): Not steady, but able to stabilize without staff assistance     MDS GG  Eating Performance: Independent.  Oral Hygiene Performance: Independent.  Toileting Hygiene Performance: Independent.  Sit to lying Performance:  "Independent  Lying to sitting on side of bed Performance: Independent.  Sit to Stand Performance: Independent.  Chair/bed-to-chair transfer Performance: Independent.  Toilet transfer Performance: Independent.   Discharge recommendations: home with home health     Discharged Condition: stable    [place] [facility]    Follow Up:  Follow-up Information     Sadia De La Rosa MD. Go on 6/27/2017.    Specialty:  Internal Medicine  Why:  Hospital Follow-up  Contact information:  [address]  [facility] [address]  [phone]             Sharda Sandhu PA-C. Go on 6/13/2017.    Specialty:  Orthopedic Surgery  Why:  Orthopedic follow-up  Contact information:  [address]  [facility] [address]  [phone]             [facility].    Specialty:  Home Health Services  Why:  Home Health Questions/Concerns  Contact information:  [address]  [place] [address]  [phone]             [facility].    Specialty:  DME Provider  Why:  Bedside Commode Questions/Concerns  Contact information:  [address]  [place] [id number] [phone]             [facility].    Specialty:  DME Provider  Why:  Wheelchair & Rolling Walker Questions/Concerns  Contact information:  [address] [facility] [address]  [phone]                 Future Appointments  Date Time Provider Department Center   6/13/2017 1:00 PM Sharda Sandhu PA-C [facility] ORTHO Adam terri   6/13/2017 1:30 PM [facility] MRI [facility]   6/13/2017 2:15 PM [facility] MRI Adam [facility]   6/27/2017 9:30 AM Sadia De La Rosa MD [facility] IM Adam terri [facility]       Patient Instructions:     WALKER FOR HOME USE   Order Specific Question Answer Comments   Type of Walker: Heavy duty    With wheels? Yes    Height: 5' 2" (1.575 m)    Weight: 119.8 kg (264 lb 1.8 oz)    Length of need (1-99 months): 12    Does patient have medical equipment at home? [email or web address]    Please check all that apply: Patient's " "condition impairs ambulation.    Please check all that apply: Patient is unable to safely ambulate without equipment.    Please check all that apply: Walker will be used for gait training.    Vendor: [employer]   Expected Date of Delivery: 6/6/2017      WHEELCHAIR FOR HOME USE   Order Specific Question Answer Comments   Hours in W/C per day: 8    Type of Wheelchair: Standard    Size(Width): 24"    Leg Support: Elevating leg rests    Arm Height: Swing away    Arm Height: Desk length    Desired seat depth: 20    Lap Belt: Velcro    Cushion: Basic    Justification for cushion: prevent skin breakdown    Height: 5' 2" (1.575 m)    Weight: 119.8 kg (264 lb 1.8 oz)    Does patient have medical equipment at home? cane, straight    Length of need (1-99 months): 12    Please check all that apply: Patient's upper body strength is sufficient for propulsion.    Please check all that apply: The patient has a cast, brace or muscloskeletal condition which prevents 90 degree flexion of the knee.    Please check all that apply: Patient mobility limitations cannot be sufficiently resolved by the use of other ambulatory therapies.    Please check all that apply: The patient has significant edema of the lower extremities that requires an elevating leg rest.    Vendor: [employer]   Expected Date of Delivery: 6/6/2017      3 IN 1 COMMODE FOR HOME USE   Order Specific Question Answer Comments   Type: Heavy duty wide   Height: 5' 2" (1.575 m)    Weight: 119.8 kg (264 lb 1.8 oz)    Does patient have medical equipment at home? cane, straight    Length of need (1-99 months): 12    Vendor: Other (use comments) [employer]   Expected Date of Delivery: 6/6/2017      TRANSFER TUB BENCH FOR HOME USE   Order Specific Question Answer Comments   Type of Transfer Tub Bench: Unpadded heavy duty   Height: 5' 2" (1.575 m)    Weight: 119.8 kg (264 lb 1.8 oz)    Does patient have medical equipment at home? cane, straight    Length " of need (1-99 months): 12    Vendor: Other (use comments) [employer]   Expected Date of Delivery: 6/6/2017      Diet general     Activity as tolerated     Sponge bath only until clinic visit     Keep surgical extremity elevated     Ice to affected area     Call MD for:  temperature >100.4     Call MD for:  persistent nausea and vomiting or diarrhea     Call MD for:  severe uncontrolled pain     Call MD for:  redness, tenderness, or signs of infection (pain, swelling, redness, odor or green/yellow discharge around incision site)     Call MD for:  difficulty breathing or increased cough     Call MD for:  worsening rash     Call MD for:  persistent dizziness, light-headedness, or visual disturbances     Call MD for:  increased confusion or weakness       Medications:  Reconciled Home Medications:   Discharge Medication List as of 6/7/2017  7:38 AM      CONTINUE these medications which have CHANGED    Details   cod liver oil Oil Do not take until PCP resumes, OTC      fish oil-omega-3 fatty acids 300-1,000 mg capsule Do not take until resumed by PCP, OTC      flaxseed oil 1,000 mg Cap Pt states takes 4 daily, total of 4000 mg daily   DO not take until restarted by PCP, OTC      hydrocodone-acetaminophen 10-325mg (NORCO)  mg Tab Take 1 tablet by mouth every 4 (four) hours as needed for Pain., Starting Mon 6/5/2017, Print      naproxen (NAPROSYN) 500 MG tablet Do not take until resume by PCP, No Print         CONTINUE these medications which have NOT CHANGED    Details   AMITIZA 24 mcg Cap Take by mouth as needed. , Starting 8/3/2016, Until Discontinued, Historical Med      amitriptyline (ELAVIL) 50 MG tablet Take 50 mg by mouth 2 (two) times daily as needed. , Until Discontinued, Historical Med      aspirin 325 MG tablet Take 1 tablet (325 mg total) by mouth 2 (two) times daily., Starting Wed 5/31/2017, Until Fri 6/30/2017, Print      b complex vitamins tablet Take 1 tablet by mouth once daily., Until Discontinued,  Historical Med      carisoprodol (SOMA) 350 MG tablet Take 350 mg by mouth 3 (three) times daily.  , Until Discontinued, Historical Med      docusate sodium (COLACE) 100 MG capsule Take 1 capsule (100 mg total) by mouth 2 (two) times daily as needed for Constipation., Starting Wed 5/31/2017, Print      ergocalciferol (VITAMIN D2) 50,000 unit Cap Take 1 capsule (50,000 Units total) by mouth twice a week., Starting 4/7/2014, Until Discontinued, Normal      esomeprazole (NEXIUM) 40 MG capsule Take 40 mg by mouth once daily., Historical Med      fluticasone (FLONASE) 50 mcg/actuation nasal spray 2 sprays by Each Nare route once daily., Starting 2/25/2014, Until Discontinued, Normal      FREESTYLE LITE STRIPS Strp Starting 5/4/2014, Until Discontinued, Historical Med      oxyMORphone (OPANA ER) 30 MG 12 hr tablet Take 30 mg by mouth every 12 (twelve) hours., Historical Med      potassium chloride SA (K-DUR,KLOR-CON) 20 MEQ tablet Take 20 mEq by mouth once daily. , Starting 12/18/2013, Until Discontinued, Historical Med      PSYLLIUM HUSK (METAMUCIL ORAL) Take by mouth Daily., Until Discontinued, Historical Med         STOP taking these medications       CINNAMON BARK (CINNAMON ORAL) Comments:   Reason for Stopping:         ibuprofen (ADVIL,MOTRIN) 800 MG tablet Comments:   Reason for Stopping:         oxycodone-acetaminophen (PERCOCET)  mg per tablet Comments:   Reason for Stopping:         promethazine (PHENERGAN) 25 MG tablet Comments:   Reason for Stopping:             Time spent on the discharge of patient: 30 minutes    Avelina Huang NP  [facility]   -Large fungating necrotic soft tissue mass on left face containing droplets of gas.  -CT HEAD showing no intracranial hemorrhage, edema or mass effect  -CT MAXILLOFACIAL BONES showing no associated bony destruction  -DDx malignancy (leukemia, T Cell lymphoma, mycosis fungoides, etc), infectious  -Denies exposure, recent trauma to the face  -Surgery Consult placed for resection evaluation

## 2021-02-11 NOTE — PROGRESS NOTE ADULT - SUBJECTIVE AND OBJECTIVE BOX
PROGRESS NOTE:     CONTACT INFO:  Minda Mcdonald MD  Internal Medicine PGY2  Pager: [phone]    Patient is a 89y old  Female who presents with a chief complaint of worsening swelling of Left face (11 Feb 2021 06:47)      SUBJECTIVE / OVERNIGHT EVENTS:  No acute events overnight.   ADDITIONAL REVIEW OF SYSTEMS:    MEDICATIONS  (STANDING):  Dakins Solution - 1/4 Strength 1 Application(s) Topical daily  enoxaparin Injectable 30 milliGRAM(s) SubCutaneous daily  influenza   Vaccine 0.5 milliLiter(s) IntraMuscular once    MEDICATIONS  (PRN):      CAPILLARY BLOOD GLUCOSE        I&O's Summary      PHYSICAL EXAM:  Vital Signs Last 24 Hrs  T(C): 36.8 (10 Feb 2021 21:45), Max: 36.8 (10 Feb 2021 21:45)  T(F): 98.2 (10 Feb 2021 21:45), Max: 98.2 (10 Feb 2021 21:45)  HR: 66 (10 Feb 2021 21:45) (64 - 66)  BP: 123/53 (10 Feb 2021 21:45) (120/69 - 123/53)  BP(mean): --  RR: 16 (10 Feb 2021 21:45) (16 - 16)  SpO2: 94% (10 Feb 2021 21:45) (94% - 94%)      LABS:                        10.1   97.65 )-----------( 209      ( 10 Feb 2021 07:28 )             34.2     02-10    142  |  108<H>  |  12  ----------------------------<  105<H>  3.6   |  23  |  1.06    Ca    9.0      10 Feb 2021 07:28  Phos  3.2     02-10  Mg     2.2     02-10    TPro  5.2<L>  /  Alb  3.0<L>  /  TBili  0.2  /  DBili  x   /  AST  8   /  ALT  8   /  AlkPhos  141<H>  02-10                RADIOLOGY & ADDITIONAL TESTS:  Results Reviewed:   Imaging Personally Reviewed:  Electrocardiogram Personally Reviewed:    COORDINATION OF CARE:  Care Discussed with Consultants/Other Providers [Y/N]:  Prior or Outpatient Records Reviewed [Y/N]:   PROGRESS NOTE:     CONTACT INFO:  Minda Mcdonald MD  Internal Medicine PGY2  Pager: [phone]    Patient is a 89y old  Female who presents with a chief complaint of worsening swelling of Left face (11 Feb 2021 06:47)      SUBJECTIVE / OVERNIGHT EVENTS:  No acute events overnight. Assessed patient this AM, no chest pain/shortness of breath/abdominal pain.   ADDITIONAL REVIEW OF SYSTEMS:    MEDICATIONS  (STANDING):  Dakins Solution - 1/4 Strength 1 Application(s) Topical daily  enoxaparin Injectable 30 milliGRAM(s) SubCutaneous daily  influenza   Vaccine 0.5 milliLiter(s) IntraMuscular once    MEDICATIONS  (PRN):      CAPILLARY BLOOD GLUCOSE        I&O's Summary      PHYSICAL EXAM:  Vital Signs Last 24 Hrs  T(C): 36.8 (10 Feb 2021 21:45), Max: 36.8 (10 Feb 2021 21:45)  T(F): 98.2 (10 Feb 2021 21:45), Max: 98.2 (10 Feb 2021 21:45)  HR: 66 (10 Feb 2021 21:45) (64 - 66)  BP: 123/53 (10 Feb 2021 21:45) (120/69 - 123/53)  BP(mean): --  RR: 16 (10 Feb 2021 21:45) (16 - 16)  SpO2: 94% (10 Feb 2021 21:45) (94% - 94%)    GENERAL: NAD  HEAD:  Atraumatic, Normocephalic  HEENT: Large black fungating mass on face   CV: Regular rate and rhythm. No murmurs, rubs, or gallops  Respiratory: normal respiratory effort, speaking in complete sentences. Lungs clear to auscultation bilaterally, no wheezes/crackles.  ABDOMEN: Soft, Nontender, Nondistended; Bowel sounds normal  : prolapsed uterus   EXTREMITIES: No lower extremity edema. Bilateral LE symmetric in size.   PSYCH: AAOx3.   NEURO: Symmetric facial expressions.       LABS:                                   10.1   97.65 )-----------( 209      ( 10 Feb 2021 07:28 )             34.2     Hgb Trend: 10.1<--, 9.8<--, 10.4<--, 10.2<--, 12.0<--  02-10    142  |  108<H>  |  12  ----------------------------<  105<H>  3.6   |  23  |  1.06    Ca    9.0      10 Feb 2021 07:28  Phos  3.2     02-10  Mg     2.2     02-10    TPro  5.2<L>  /  Alb  3.0<L>  /  TBili  0.2  /  DBili  x   /  AST  8   /  ALT  8   /  AlkPhos  141<H>  02-10    Creatinine Trend: 1.06<--, 1.06<--, 0.95<--, 1.03<--, 1.07<--                        RADIOLOGY & ADDITIONAL TESTS:  Results Reviewed:   Imaging Personally Reviewed:  Electrocardiogram Personally Reviewed:    COORDINATION OF CARE:  Care Discussed with Consultants/Other Providers [Y/N]:  Prior or Outpatient Records Reviewed [Y/N]:

## 2021-02-11 NOTE — DIETITIAN INITIAL EVALUATION ADULT. - PROBLEM/PLAN-1
I saw this patient with the resident, independently confirmed the key findings of the history and physical exam, reviewed the pertinent test results, and agree with the assessment, treatment plan, and orders.  Pain in joint of right shoulder    Elevated INR    Discussed sleep hygiene, daily walking, consistency in diet to avoid swings in INR.     DISPLAY PLAN FREE TEXT

## 2021-02-11 NOTE — DISCHARGE NOTE PROVIDER - NSDCFUADDAPPT_GEN_ALL_CORE_FT
[facility]   [address] [address]  [phone]   Follow-Up Time: 1 week

## 2021-02-11 NOTE — DIETITIAN INITIAL EVALUATION ADULT. - PERTINENT MEDS FT
MEDICATIONS  (STANDING):  Dakins Solution - 1/4 Strength 1 Application(s) Topical daily  enoxaparin Injectable 30 milliGRAM(s) SubCutaneous daily  influenza   Vaccine 0.5 milliLiter(s) IntraMuscular once

## 2021-02-11 NOTE — DIETITIAN INITIAL EVALUATION ADULT. - PROBLEM SELECTOR PLAN 3
Left necrotic wound on left shin with soft tissue wound on left wrist  -XR confirming heterogenous soft tissue masses of both  -Wound care consult

## 2021-02-11 NOTE — PROGRESS NOTE ADULT - PROBLEM SELECTOR PLAN 2
WBC of 110.56 with lymphocytic predominance with elevated CRP, lactate  DDx malignancy (leukemia ie. 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 Informed of high likelihood of cancer and understands. Has full capacity to make this decision.   -From GOC conversation: Unable to decide on code status after several GOC convo. Patient does not want to discuss code status further.

## 2021-02-11 NOTE — PROGRESS NOTE ADULT - PROBLEM SELECTOR PLAN 4
UA positive for bacteria with LE  -Previously endorsing dysuria with frequency, improved  -Ceftriaxone x 3 days (2/6-2/8)  -Monitor for increasing temp

## 2021-02-11 NOTE — DIETITIAN INITIAL EVALUATION ADULT. - ADD RECOMMEND
via Foodservice will add Magic Cup 1x daily. Encourage PO intake and honor food preferences as able.

## 2021-02-12 PROCEDURE — 99232 SBSQ HOSP IP/OBS MODERATE 35: CPT | Mod: GC

## 2021-02-12 RX ADMIN — ENOXAPARIN SODIUM 30 MILLIGRAM(S): 100 INJECTION SUBCUTANEOUS at 13:27

## 2021-02-12 RX ADMIN — Medication 1 APPLICATION(S): at 13:27

## 2021-02-12 NOTE — PROGRESS NOTE ADULT - PROBLEM SELECTOR PLAN 1
-F/u Hospice care referral  -Large fungating necrotic soft tissue mass on left face containing droplets of gas.  -CT HEAD showing no intracranial hemorrhage, edema or mass effect  -CT MAXILLOFACIAL BONES showing no associated bony destruction  -DDx malignancy (leukemia, T Cell lymphoma, mycosis fungoides, etc), infectious  -Denies exposure, recent trauma to the face  -Re: MRI and Surgery, pt states that she does not want to work up this mass and does not want biopsy or surgical intervention for it. Reiterated sentiment again. Has full capacity to make this decision.  -patient's daughters aware of patient's decision -Approved for Hospice care  -Large fungating necrotic soft tissue mass on left face containing droplets of gas.  -CT HEAD showing no intracranial hemorrhage, edema or mass effect  -CT MAXILLOFACIAL BONES showing no associated bony destruction  -DDx malignancy (leukemia, T Cell lymphoma, mycosis fungoides, etc), infectious  -Denies exposure, recent trauma to the face  -Re: MRI and Surgery, pt states that she does not want to work up this mass and does not want biopsy or surgical intervention for it. Reiterated sentiment again. Has full capacity to make this decision.  -patient's daughters aware of patient's decision

## 2021-02-12 NOTE — PROGRESS NOTE ADULT - SUBJECTIVE AND OBJECTIVE BOX
Nereida Robles, PGY-1  Internal Medicine  Pager: [phone]/[facility] [phone]    PROGRESS NOTE:     Patient is a 89y old  Female who presents with a chief complaint of worsening swelling of Left face. pt with large fungating mass on L cheek. (11 Feb 2021 12:19)      SUBJECTIVE / OVERNIGHT EVENTS:    ADDITIONAL REVIEW OF SYSTEMS:    MEDICATIONS  (STANDING):  Dakins Solution - 1/4 Strength 1 Application(s) Topical daily  enoxaparin Injectable 30 milliGRAM(s) SubCutaneous daily  influenza   Vaccine 0.5 milliLiter(s) IntraMuscular once    MEDICATIONS  (PRN):      CAPILLARY BLOOD GLUCOSE        I&O's Summary      PHYSICAL EXAM:  Vital Signs Last 24 Hrs  T(C): 36.8 (11 Feb 2021 22:18), Max: 36.8 (11 Feb 2021 22:18)  T(F): 98.3 (11 Feb 2021 22:18), Max: 98.3 (11 Feb 2021 22:18)  HR: 71 (11 Feb 2021 22:18) (62 - 71)  BP: 121/70 (11 Feb 2021 22:18) (111/51 - 121/70)  BP(mean): --  RR: 17 (11 Feb 2021 22:18) (17 - 17)  SpO2: 94% (11 Feb 2021 22:18) (94% - 95%)    CONSTITUTIONAL: NAD, well-developed  RESPIRATORY: Normal respiratory effort; lungs are clear to auscultation bilaterally  CARDIOVASCULAR: Regular rate and rhythm, normal S1 and S2, no murmur/rub/gallop; No lower extremity edema; Peripheral pulses are 2+ bilaterally  ABDOMEN: Nontender to palpation, normoactive bowel sounds, no rebound/guarding; No hepatosplenomegaly  MUSCLOSKELETAL: no clubbing or cyanosis of digits; no joint swelling or tenderness to palpation  PSYCH: A+O to person, place, and time; affect appropriate    LABS:                        10.1   97.65 )-----------( 209      ( 10 Feb 2021 07:28 )             34.2     02-10    142  |  108<H>  |  12  ----------------------------<  105<H>  3.6   |  23  |  1.06    Ca    9.0      10 Feb 2021 07:28  Phos  3.2     02-10  Mg     2.2     02-10    TPro  5.2<L>  /  Alb  3.0<L>  /  TBili  0.2  /  DBili  x   /  AST  8   /  ALT  8   /  AlkPhos  141<H>  02-10                RADIOLOGY & ADDITIONAL TESTS:  Results Reviewed:   Imaging Personally Reviewed:  Electrocardiogram Personally Reviewed:    COORDINATION OF CARE:  Care Discussed with Consultants/Other Providers [Y/N]:  Prior or Outpatient Records Reviewed [Y/N]:   Nereida Robles, PGY-1  Internal Medicine  Pager: [phone]/[facility] [phone]    PROGRESS NOTE:     Patient is a 89y old  Female who presents with a chief complaint of worsening swelling of Left face. pt with large fungating mass on L cheek. (11 Feb 2021 12:19)      SUBJECTIVE / OVERNIGHT EVENTS: No acute events overnight. Pt seen and examined at bedside. Denies fever, chills, nausea, vomiting, cp, or sob.     ADDITIONAL REVIEW OF SYSTEMS: Above plus no pain, numbness of face. Denies dysphagia, difficulty swallowing.     MEDICATIONS  (STANDING):  Dakins Solution - 1/4 Strength 1 Application(s) Topical daily  enoxaparin Injectable 30 milliGRAM(s) SubCutaneous daily  influenza   Vaccine 0.5 milliLiter(s) IntraMuscular once    MEDICATIONS  (PRN):      CAPILLARY BLOOD GLUCOSE        I&O's Summary      PHYSICAL EXAM:  Vital Signs Last 24 Hrs  T(C): 36.8 (11 Feb 2021 22:18), Max: 36.8 (11 Feb 2021 22:18)  T(F): 98.3 (11 Feb 2021 22:18), Max: 98.3 (11 Feb 2021 22:18)  HR: 71 (11 Feb 2021 22:18) (62 - 71)  BP: 121/70 (11 Feb 2021 22:18) (111/51 - 121/70)  BP(mean): --  RR: 17 (11 Feb 2021 22:18) (17 - 17)  SpO2: 94% (11 Feb 2021 22:18) (94% - 95%)    GENERAL: NAD, elderly female  HEAD: +Large fungating malodorous soft tissue mass localized to left cheek with brownish serous discharge, doesn't cross midline, +non-tender, otherwise normocephalic and normal facies  EYES: EOMI, PERRLA, conjunctiva and sclera clear  ENMT: No tonsillar erythema, exudates, or enlargement; Moist mucous membranes  NECK: Supple, No JVD, Normal Thyroid  CHEST/LUNG: Decreased lung sounds on left lower lobe, otherwise clear to auscultation bilaterally; No rales, rhonchi, wheezing, or rubs  HEART: Regular rate and rhythm; S1 and S2; No murmurs, rubs, or gallops  ABDOMEN: +Splenomegaly. Soft, Nontender, Nondistended: Bowel sounds present  EXTREMITIES: 2+ Peripheral Pulses, No clubbing, cyanosis, or edema  LYMPH: +Left axillary LAD  SKIN: +Left shin necrotic lesion, left wrist mass with bandaging c/d/i   PSYCH: AOx4, affect appropriate    LABS:                        10.1   97.65 )-----------( 209      ( 10 Feb 2021 07:28 )             34.2     02-10    142  |  108<H>  |  12  ----------------------------<  105<H>  3.6   |  23  |  1.06    Ca    9.0      10 Feb 2021 07:28  Phos  3.2     02-10  Mg     2.2     02-10    TPro  5.2<L>  /  Alb  3.0<L>  /  TBili  0.2  /  DBili  x   /  AST  8   /  ALT  8   /  AlkPhos  141<H>  02-10                RADIOLOGY & ADDITIONAL TESTS:  Results Reviewed:   Imaging Personally Reviewed:  Electrocardiogram Personally Reviewed:    COORDINATION OF CARE:  Care Discussed with Consultants/Other Providers [Y/N]:  Prior or Outpatient Records Reviewed [Y/N]:

## 2021-02-12 NOTE — CHART NOTE - NSCHARTNOTEFT_GEN_A_CORE
89F no pmh presents to ED for a left cheek fungating mass, found to have leukocytosis and hematology consulted for eval:     #leukocytosis  -ANC and ALC both elevated.   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 recommend skin biopsy for further assessment.  - no interventions at present for an underlying indolent B cell lymphoma. Informed patient of diagnosis. She is not interested at this moment in doing a biopsy or further work-up of the mass (unknown primary cancer) even if this could bring symptomatic relief.  -patient can f/u at [facility] if she would like to be evaluated for further w/u and treatment.    -dispo planning per primary team     Hematology signing off. Please call back with any questions.     Dorian Wyman Heme/onc PGY4

## 2021-02-12 NOTE — PROGRESS NOTE ADULT - PROBLEM SELECTOR PLAN 6
Diet: Regular diet  DVT: Lovenox  Dispo: Home PT, hospice referral Diet: Regular diet  DVT: Lovenox  Dispo: Home PT, pending hospice

## 2021-02-12 NOTE — PROGRESS NOTE ADULT - PROBLEM SELECTOR PLAN 2
WBC of 110.56 with lymphocytic predominance with elevated CRP, lactate  DDx malignancy (leukemia ie. 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 Informed of high likelihood of cancer and understands. Has full capacity to make this decision.   -From GOC conversation: Unable to decide on code status after several GOC convo. Patient does not want to discuss code status further. WBC of 110.56 with lymphocytic predominance with elevated CRP, lactate  DDx malignancy (leukemia ie. 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 Informed of high likelihood of cancer and understands. Has full capacity to make this decision.   -From GOC conversation: Unable to decide on code status after several GOC convo. Patient does not want to discuss code status further.

## 2021-02-13 LAB
ANION GAP SERPL CALC-SCNC: 15 MMOL/L — HIGH (ref 7–14)
BASOPHILS # BLD AUTO: 0 K/UL — SIGNIFICANT CHANGE UP (ref 0–0.2)
BASOPHILS NFR BLD AUTO: 0 % — SIGNIFICANT CHANGE UP (ref 0–2)
BUN SERPL-MCNC: 12 MG/DL — SIGNIFICANT CHANGE UP (ref 7–23)
CALCIUM SERPL-MCNC: 9.1 MG/DL — SIGNIFICANT CHANGE UP (ref 8.4–10.5)
CHLORIDE SERPL-SCNC: 104 MMOL/L — SIGNIFICANT CHANGE UP (ref 98–107)
CO2 SERPL-SCNC: 21 MMOL/L — LOW (ref 22–31)
CREAT SERPL-MCNC: 0.92 MG/DL — SIGNIFICANT CHANGE UP (ref 0.5–1.3)
EOSINOPHIL # BLD AUTO: 0 K/UL — SIGNIFICANT CHANGE UP (ref 0–0.5)
EOSINOPHIL NFR BLD AUTO: 0 % — SIGNIFICANT CHANGE UP (ref 0–6)
GLUCOSE SERPL-MCNC: 82 MG/DL — SIGNIFICANT CHANGE UP (ref 70–99)
HCT VFR BLD CALC: 35.4 % — SIGNIFICANT CHANGE UP (ref 34.5–45)
HGB BLD-MCNC: 10.2 G/DL — LOW (ref 11.5–15.5)
IANC: 43.43 K/UL — HIGH (ref 1.5–8.5)
LDH SERPL L TO P-CCNC: 156 U/L — SIGNIFICANT CHANGE UP (ref 135–225)
LYMPHOCYTES # BLD AUTO: 35.7 % — SIGNIFICANT CHANGE UP (ref 13–44)
LYMPHOCYTES # BLD AUTO: 36.13 K/UL — HIGH (ref 1–3.3)
MAGNESIUM SERPL-MCNC: 2.4 MG/DL — SIGNIFICANT CHANGE UP (ref 1.6–2.6)
MCHC RBC-ENTMCNC: 26.8 PG — LOW (ref 27–34)
MCHC RBC-ENTMCNC: 28.8 GM/DL — LOW (ref 32–36)
MCV RBC AUTO: 93.2 FL — SIGNIFICANT CHANGE UP (ref 80–100)
MONOCYTES # BLD AUTO: 3.64 K/UL — HIGH (ref 0–0.9)
MONOCYTES NFR BLD AUTO: 3.6 % — SIGNIFICANT CHANGE UP (ref 2–14)
NEUTROPHILS # BLD AUTO: 54.24 K/UL — HIGH (ref 1.8–7.4)
NEUTROPHILS NFR BLD AUTO: 53.6 % — SIGNIFICANT CHANGE UP (ref 43–77)
PHOSPHATE SERPL-MCNC: 3.2 MG/DL — SIGNIFICANT CHANGE UP (ref 2.5–4.5)
PLATELET # BLD AUTO: 189 K/UL — SIGNIFICANT CHANGE UP (ref 150–400)
POTASSIUM SERPL-MCNC: 3.9 MMOL/L — SIGNIFICANT CHANGE UP (ref 3.5–5.3)
POTASSIUM SERPL-SCNC: 3.9 MMOL/L — SIGNIFICANT CHANGE UP (ref 3.5–5.3)
RBC # BLD: 3.8 M/UL — SIGNIFICANT CHANGE UP (ref 3.8–5.2)
RBC # FLD: 14.8 % — HIGH (ref 10.3–14.5)
SODIUM SERPL-SCNC: 140 MMOL/L — SIGNIFICANT CHANGE UP (ref 135–145)
URATE SERPL-MCNC: 7.4 MG/DL — HIGH (ref 2.5–7)
WBC # BLD: 101.2 K/UL — CRITICAL HIGH (ref 3.8–10.5)
WBC # FLD AUTO: 101.2 K/UL — CRITICAL HIGH (ref 3.8–10.5)

## 2021-02-13 PROCEDURE — 99232 SBSQ HOSP IP/OBS MODERATE 35: CPT | Mod: GC

## 2021-02-13 RX ADMIN — Medication 1 APPLICATION(S): at 12:34

## 2021-02-13 NOTE — PROGRESS NOTE ADULT - PROBLEM SELECTOR PLAN 1
-Approved for Hospice care  -Large fungating necrotic soft tissue mass on left face containing droplets of gas.  -CT HEAD showing no intracranial hemorrhage, edema or mass effect  -CT MAXILLOFACIAL BONES showing no associated bony destruction  -DDx malignancy (leukemia, T Cell lymphoma, mycosis fungoides, etc), infectious  -Denies exposure, recent trauma to the face  -Re: MRI and Surgery, pt states that she does not want to work up this mass and does not want biopsy or surgical intervention for it. Reiterated sentiment again. Has full capacity to make this decision.  -patient's daughters aware of patient's decision

## 2021-02-13 NOTE — PROGRESS NOTE ADULT - PROBLEM SELECTOR PLAN 2
WBC of 110.56 with lymphocytic predominance with elevated CRP, lactate  DDx malignancy (leukemia ie. 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 Informed of high likelihood of cancer and understands. Has full capacity to make this decision.   -From GOC conversation: Unable to decide on code status after several GOC convo. Patient does not want to discuss code status further.

## 2021-02-13 NOTE — PROVIDER CONTACT NOTE (CRITICAL VALUE NOTIFICATION) - ASSESSMENT
Alert and oriented x4,v/s stable.
Alert and oriented x4,v/s stable.
No acute distress noted. Patient A&Ox3
. Patient resting in bed comfortably.

## 2021-02-13 NOTE — PROGRESS NOTE ADULT - SUBJECTIVE AND OBJECTIVE BOX
Nereida Robles, PGY-1  Internal Medicine  Pager: [phone]/[facility] [phone]    PROGRESS NOTE:     Patient is a 89y old  Female who presents with a chief complaint of worsening swelling of Left face (12 Feb 2021 07:19)      SUBJECTIVE / OVERNIGHT EVENTS:    ADDITIONAL REVIEW OF SYSTEMS:    MEDICATIONS  (STANDING):  Dakins Solution - 1/4 Strength 1 Application(s) Topical daily  enoxaparin Injectable 30 milliGRAM(s) SubCutaneous daily  influenza   Vaccine 0.5 milliLiter(s) IntraMuscular once    MEDICATIONS  (PRN):      CAPILLARY BLOOD GLUCOSE        I&O's Summary    12 Feb 2021 07:01  -  13 Feb 2021 07:00  --------------------------------------------------------  IN: 1200 mL / OUT: 700 mL / NET: 500 mL        PHYSICAL EXAM:  Vital Signs Last 24 Hrs  T(C): 36.2 (12 Feb 2021 22:11), Max: 36.7 (12 Feb 2021 12:20)  T(F): 97.1 (12 Feb 2021 22:11), Max: 98 (12 Feb 2021 12:20)  HR: 76 (12 Feb 2021 22:11) (63 - 76)  BP: 119/62 (12 Feb 2021 22:11) (118/50 - 119/62)  BP(mean): --  RR: 16 (12 Feb 2021 22:11) (16 - 16)  SpO2: 94% (12 Feb 2021 22:11) (94% - 97%)    CONSTITUTIONAL: NAD, well-developed  RESPIRATORY: Normal respiratory effort; lungs are clear to auscultation bilaterally  CARDIOVASCULAR: Regular rate and rhythm, normal S1 and S2, no murmur/rub/gallop; No lower extremity edema; Peripheral pulses are 2+ bilaterally  ABDOMEN: Nontender to palpation, normoactive bowel sounds, no rebound/guarding; No hepatosplenomegaly  MUSCLOSKELETAL: no clubbing or cyanosis of digits; no joint swelling or tenderness to palpation  PSYCH: A+O to person, place, and time; affect appropriate    LABS:                        10.2   101.20 )-----------( 189      ( 13 Feb 2021 07:43 )             35.4     02-13    140  |  104  |  12  ----------------------------<  82  3.9   |  21<L>  |  0.92    Ca    9.1      13 Feb 2021 07:50  Phos  3.2     02-13  Mg     2.4     02-13                  RADIOLOGY & ADDITIONAL TESTS:  Results Reviewed:   Imaging Personally Reviewed:  Electrocardiogram Personally Reviewed:    COORDINATION OF CARE:  Care Discussed with Consultants/Other Providers [Y/N]:  Prior or Outpatient Records Reviewed [Y/N]:   Nereida Robles, PGY-1  Internal Medicine  Pager: [phone]/[facility] [phone]    PROGRESS NOTE:     Patient is a 89y old  Female who presents with a chief complaint of worsening swelling of Left face (12 Feb 2021 07:19)      SUBJECTIVE / OVERNIGHT EVENTS: No acute events overnight. Pt seen and examined at bedside. Denies fever, chills, nausea, vomiting, cp, or sob.     ADDITIONAL REVIEW OF SYSTEMS: Above plus denies    MEDICATIONS  (STANDING):  Dakins Solution - 1/4 Strength 1 Application(s) Topical daily  enoxaparin Injectable 30 milliGRAM(s) SubCutaneous daily  influenza   Vaccine 0.5 milliLiter(s) IntraMuscular once    MEDICATIONS  (PRN):      CAPILLARY BLOOD GLUCOSE        I&O's Summary    12 Feb 2021 07:01  -  13 Feb 2021 07:00  --------------------------------------------------------  IN: 1200 mL / OUT: 700 mL / NET: 500 mL        PHYSICAL EXAM:  Vital Signs Last 24 Hrs  T(C): 36.2 (12 Feb 2021 22:11), Max: 36.7 (12 Feb 2021 12:20)  T(F): 97.1 (12 Feb 2021 22:11), Max: 98 (12 Feb 2021 12:20)  HR: 76 (12 Feb 2021 22:11) (63 - 76)  BP: 119/62 (12 Feb 2021 22:11) (118/50 - 119/62)  BP(mean): --  RR: 16 (12 Feb 2021 22:11) (16 - 16)  SpO2: 94% (12 Feb 2021 22:11) (94% - 97%)    CONSTITUTIONAL: NAD, well-developed  RESPIRATORY: Normal respiratory effort; lungs are clear to auscultation bilaterally  CARDIOVASCULAR: Regular rate and rhythm, normal S1 and S2, no murmur/rub/gallop; No lower extremity edema; Peripheral pulses are 2+ bilaterally  ABDOMEN: Nontender to palpation, normoactive bowel sounds, no rebound/guarding; No hepatosplenomegaly  MUSCLOSKELETAL: no clubbing or cyanosis of digits; no joint swelling or tenderness to palpation  PSYCH: A+O to person, place, and time; affect appropriate    LABS:                        10.2   101.20 )-----------( 189      ( 13 Feb 2021 07:43 )             35.4     02-13    140  |  104  |  12  ----------------------------<  82  3.9   |  21<L>  |  0.92    Ca    9.1      13 Feb 2021 07:50  Phos  3.2     02-13  Mg     2.4     02-13                  RADIOLOGY & ADDITIONAL TESTS:  Results Reviewed:   Imaging Personally Reviewed:  Electrocardiogram Personally Reviewed:    COORDINATION OF CARE:  Care Discussed with Consultants/Other Providers [Y/N]:  Prior or Outpatient Records Reviewed [Y/N]:   Nereida Robles, PGY-1  Internal Medicine  Pager: [phone]/[facility] [phone]    PROGRESS NOTE:     Patient is a 89y old  Female who presents with a chief complaint of worsening swelling of Left face (12 Feb 2021 07:19)      SUBJECTIVE / OVERNIGHT EVENTS: No acute events overnight. Pt seen and examined at bedside. Denies fever, chills, nausea, vomiting, cp, or sob.     ADDITIONAL REVIEW OF SYSTEMS: Above plus denies change in vision, dysphagia, dysarthria, numbness/pain of face, CP, SOB.     MEDICATIONS  (STANDING):  Dakins Solution - 1/4 Strength 1 Application(s) Topical daily  enoxaparin Injectable 30 milliGRAM(s) SubCutaneous daily  influenza   Vaccine 0.5 milliLiter(s) IntraMuscular once    MEDICATIONS  (PRN):      CAPILLARY BLOOD GLUCOSE        I&O's Summary    12 Feb 2021 07:01  -  13 Feb 2021 07:00  --------------------------------------------------------  IN: 1200 mL / OUT: 700 mL / NET: 500 mL        PHYSICAL EXAM:  Vital Signs Last 24 Hrs  T(C): 36.2 (12 Feb 2021 22:11), Max: 36.7 (12 Feb 2021 12:20)  T(F): 97.1 (12 Feb 2021 22:11), Max: 98 (12 Feb 2021 12:20)  HR: 76 (12 Feb 2021 22:11) (63 - 76)  BP: 119/62 (12 Feb 2021 22:11) (118/50 - 119/62)  BP(mean): --  RR: 16 (12 Feb 2021 22:11) (16 - 16)  SpO2: 94% (12 Feb 2021 22:11) (94% - 97%)    GENERAL: NAD, elderly female  HEAD: +Large fungating malodorous soft tissue mass localized to left cheek with brownish serous discharge, doesn't cross midline, +non-tender, otherwise normocephalic and normal facies  EYES: EOMI, PERRLA, conjunctiva and sclera clear  ENMT: No tonsillar erythema, exudates, or enlargement; Moist mucous membranes  NECK: Supple, No JVD, Normal Thyroid  CHEST/LUNG: Decreased lung sounds on left lower lobe, otherwise clear to auscultation bilaterally; No rales, rhonchi, wheezing, or rubs  HEART: Regular rate and rhythm; S1 and S2; No murmurs, rubs, or gallops  ABDOMEN: +Splenomegaly. Soft, Nontender, Nondistended: Bowel sounds present  EXTREMITIES: 2+ Peripheral Pulses, No clubbing, cyanosis, or edema  LYMPH: +Left axillary LAD  SKIN: +Left shin necrotic lesion, left wrist mass with bandaging c/d/i   PSYCH: AOx4, affect appropriate    LABS:                        10.2   101.20 )-----------( 189      ( 13 Feb 2021 07:43 )             35.4     02-13    140  |  104  |  12  ----------------------------<  82  3.9   |  21<L>  |  0.92    Ca    9.1      13 Feb 2021 07:50  Phos  3.2     02-13  Mg     2.4     02-13                  RADIOLOGY & ADDITIONAL TESTS:  Results Reviewed:   Imaging Personally Reviewed:  Electrocardiogram Personally Reviewed:    COORDINATION OF CARE:  Care Discussed with Consultants/Other Providers [Y/N]:  Prior or Outpatient Records Reviewed [Y/N]:

## 2021-02-14 PROCEDURE — 99232 SBSQ HOSP IP/OBS MODERATE 35: CPT | Mod: GC

## 2021-02-14 RX ADMIN — Medication 1 APPLICATION(S): at 12:38

## 2021-02-14 NOTE — PROGRESS NOTE ADULT - PROBLEM SELECTOR PLAN 5
Notable uterine and bladder prolapse on exam and CT Scan.  -UA positive for bacteria and LE, urinary frequency  -GYN consult-outpatient follow-up Notable uterine and bladder prolapse on exam and CT Scan.  -UA positive for bacteria and LE, urinary frequency  -Patient refused GYN eval inpatient  -GYN consult-outpatient follow-up

## 2021-02-14 NOTE — PROGRESS NOTE ADULT - SUBJECTIVE AND OBJECTIVE BOX
Nereida Robles, PGY-1  Internal Medicine  Pager: [phone]/[facility] [phone]    PROGRESS NOTE:     Patient is a 89y old  Female who presents with a chief complaint of worsening swelling of Left face (13 Feb 2021 09:33)      SUBJECTIVE / OVERNIGHT EVENTS:    ADDITIONAL REVIEW OF SYSTEMS:    MEDICATIONS  (STANDING):  Dakins Solution - 1/4 Strength 1 Application(s) Topical daily  enoxaparin Injectable 30 milliGRAM(s) SubCutaneous daily  influenza   Vaccine 0.5 milliLiter(s) IntraMuscular once    MEDICATIONS  (PRN):      CAPILLARY BLOOD GLUCOSE        I&O's Summary    13 Feb 2021 07:01  -  14 Feb 2021 07:00  --------------------------------------------------------  IN: 240 mL / OUT: 300 mL / NET: -60 mL        PHYSICAL EXAM:  Vital Signs Last 24 Hrs  T(C): 36.3 (14 Feb 2021 04:13), Max: 36.3 (14 Feb 2021 04:13)  T(F): 97.3 (14 Feb 2021 04:13), Max: 97.3 (14 Feb 2021 04:13)  HR: 66 (14 Feb 2021 04:13) (60 - 66)  BP: 123/60 (14 Feb 2021 04:13) (123/60 - 131/65)  BP(mean): --  RR: 16 (14 Feb 2021 04:13) (16 - 17)  SpO2: 95% (14 Feb 2021 04:13) (94% - 95%)    CONSTITUTIONAL: NAD, well-developed  RESPIRATORY: Normal respiratory effort; lungs are clear to auscultation bilaterally  CARDIOVASCULAR: Regular rate and rhythm, normal S1 and S2, no murmur/rub/gallop; No lower extremity edema; Peripheral pulses are 2+ bilaterally  ABDOMEN: Nontender to palpation, normoactive bowel sounds, no rebound/guarding; No hepatosplenomegaly  MUSCLOSKELETAL: no clubbing or cyanosis of digits; no joint swelling or tenderness to palpation  PSYCH: A+O to person, place, and time; affect appropriate    LABS:                        10.2   101.20 )-----------( 189      ( 13 Feb 2021 07:43 )             35.4     02-13    140  |  104  |  12  ----------------------------<  82  3.9   |  21<L>  |  0.92    Ca    9.1      13 Feb 2021 07:50  Phos  3.2     02-13  Mg     2.4     02-13                  RADIOLOGY & ADDITIONAL TESTS:  Results Reviewed:   Imaging Personally Reviewed:  Electrocardiogram Personally Reviewed:    COORDINATION OF CARE:  Care Discussed with Consultants/Other Providers [Y/N]:  Prior or Outpatient Records Reviewed [Y/N]:   Nereida Robles, PGY-1  Internal Medicine  Pager: [phone]/[place] [phone]    PROGRESS NOTE:     Patient is a 89y old  Female who presents with a chief complaint of worsening swelling of Left face (13 Feb 2021 09:33)      SUBJECTIVE / OVERNIGHT EVENTS: No acute events overnight. Pt seen and examined at bedside. Denies fever, chills, nausea, vomiting, cp, or sob.     ADDITIONAL REVIEW OF SYSTEMS: No complaints of change in vision, dysphagia, odynophagia. Denies pain, numbness of soft tissue mass on face, wrist nor wound on shin.      MEDICATIONS  (STANDING):  Dakins Solution - 1/4 Strength 1 Application(s) Topical daily  enoxaparin Injectable 30 milliGRAM(s) SubCutaneous daily  influenza   Vaccine 0.5 milliLiter(s) IntraMuscular once    MEDICATIONS  (PRN):      CAPILLARY BLOOD GLUCOSE        I&O's Summary    13 Feb 2021 07:01  -  14 Feb 2021 07:00  --------------------------------------------------------  IN: 240 mL / OUT: 300 mL / NET: -60 mL        PHYSICAL EXAM:  Vital Signs Last 24 Hrs  T(C): 36.3 (14 Feb 2021 04:13), Max: 36.3 (14 Feb 2021 04:13)  T(F): 97.3 (14 Feb 2021 04:13), Max: 97.3 (14 Feb 2021 04:13)  HR: 66 (14 Feb 2021 04:13) (60 - 66)  BP: 123/60 (14 Feb 2021 04:13) (123/60 - 131/65)  BP(mean): --  RR: 16 (14 Feb 2021 04:13) (16 - 17)  SpO2: 95% (14 Feb 2021 04:13) (94% - 95%)    GENERAL: NAD, elderly female  HEAD: +Large fungating malodorous soft tissue mass localized to left cheek with brownish serous discharge, smaller in size from admission, doesn't cross midline, +non-tender, otherwise normocephalic and normal facies  EYES: EOMI, PERRLA, conjunctiva and sclera clear  ENMT: No tonsillar erythema, exudates, or enlargement; Moist mucous membranes  NECK: Supple, No JVD, Normal Thyroid  CHEST/LUNG: Decreased lung sounds on left lower lobe, otherwise clear to auscultation bilaterally; No rales, rhonchi, wheezing, or rubs  HEART: Regular rate and rhythm; S1 and S2; No murmurs, rubs, or gallops  ABDOMEN: +Splenomegaly. Soft, Nontender, Nondistended: Bowel sounds present  EXTREMITIES: Palpable Peripheral Pulses, No clubbing, cyanosis, or edema  LYMPH: +Left axillary LAD  SKIN: +Left shin necrotic lesion covered in bandaging C/d/i, left wrist soft tissue covered in bandaging c/d/i, non TTP  PSYCH: AOx4, affect appropriate    LABS:                        10.2   101.20 )-----------( 189      ( 13 Feb 2021 07:43 )             35.4     02-13    140  |  104  |  12  ----------------------------<  82  3.9   |  21<L>  |  0.92    Ca    9.1      13 Feb 2021 07:50  Phos  3.2     02-13  Mg     2.4     02-13                  RADIOLOGY & ADDITIONAL TESTS:  Results Reviewed:   Imaging Personally Reviewed:  Electrocardiogram Personally Reviewed:    COORDINATION OF CARE:  Care Discussed with Consultants/Other Providers [Y/N]:  Prior or Outpatient Records Reviewed [Y/N]:

## 2021-02-15 PROCEDURE — 99232 SBSQ HOSP IP/OBS MODERATE 35: CPT | Mod: GC

## 2021-02-15 RX ADMIN — Medication 1 APPLICATION(S): at 10:20

## 2021-02-15 NOTE — PROGRESS NOTE ADULT - SUBJECTIVE AND OBJECTIVE BOX
Nereida Robles, PGY-1  Internal Medicine  Pager: [phone]/[facility] [phone]    PROGRESS NOTE:     Patient is a 89y old  Female who presents with a chief complaint of worsening swelling of Left face (14 Feb 2021 07:22)      SUBJECTIVE / OVERNIGHT EVENTS:    ADDITIONAL REVIEW OF SYSTEMS:    MEDICATIONS  (STANDING):  Dakins Solution - 1/4 Strength 1 Application(s) Topical daily  enoxaparin Injectable 30 milliGRAM(s) SubCutaneous daily  influenza   Vaccine 0.5 milliLiter(s) IntraMuscular once    MEDICATIONS  (PRN):      CAPILLARY BLOOD GLUCOSE        I&O's Summary    13 Feb 2021 07:01  -  14 Feb 2021 07:00  --------------------------------------------------------  IN: 240 mL / OUT: 300 mL / NET: -60 mL        PHYSICAL EXAM:  Vital Signs Last 24 Hrs  T(C): 36.2 (14 Feb 2021 21:21), Max: 36.9 (14 Feb 2021 14:10)  T(F): 97.2 (14 Feb 2021 21:21), Max: 98.4 (14 Feb 2021 14:10)  HR: 66 (14 Feb 2021 21:21) (66 - 67)  BP: 112/68 (14 Feb 2021 21:21) (112/68 - 113/64)  BP(mean): --  RR: 17 (14 Feb 2021 21:21) (17 - 17)  SpO2: 97% (14 Feb 2021 21:21) (94% - 97%)    CONSTITUTIONAL: NAD, well-developed  RESPIRATORY: Normal respiratory effort; lungs are clear to auscultation bilaterally  CARDIOVASCULAR: Regular rate and rhythm, normal S1 and S2, no murmur/rub/gallop; No lower extremity edema; Peripheral pulses are 2+ bilaterally  ABDOMEN: Nontender to palpation, normoactive bowel sounds, no rebound/guarding; No hepatosplenomegaly  MUSCLOSKELETAL: no clubbing or cyanosis of digits; no joint swelling or tenderness to palpation  PSYCH: A+O to person, place, and time; affect appropriate    LABS:                        10.2   101.20 )-----------( 189      ( 13 Feb 2021 07:43 )             35.4     02-13    140  |  104  |  12  ----------------------------<  82  3.9   |  21<L>  |  0.92    Ca    9.1      13 Feb 2021 07:50  Phos  3.2     02-13  Mg     2.4     02-13                  RADIOLOGY & ADDITIONAL TESTS:  Results Reviewed:   Imaging Personally Reviewed:  Electrocardiogram Personally Reviewed:    COORDINATION OF CARE:  Care Discussed with Consultants/Other Providers [Y/N]:  Prior or Outpatient Records Reviewed [Y/N]:   Nereida Robles, PGY-1  Internal Medicine  Pager: [phone]/[facility] [phone]    PROGRESS NOTE:     Patient is a 89y old  Female who presents with a chief complaint of worsening swelling of Left face (14 Feb 2021 07:22)      SUBJECTIVE / OVERNIGHT EVENTS: No acute events overnight. Pt seen and examined at bedside. Denies fever, chills, nausea, vomiting, cp, or sob.     ADDITIONAL REVIEW OF SYSTEMS: Above    MEDICATIONS  (STANDING):  Dakins Solution - 1/4 Strength 1 Application(s) Topical daily  enoxaparin Injectable 30 milliGRAM(s) SubCutaneous daily  influenza   Vaccine 0.5 milliLiter(s) IntraMuscular once    MEDICATIONS  (PRN):      CAPILLARY BLOOD GLUCOSE        I&O's Summary    13 Feb 2021 07:01  -  14 Feb 2021 07:00  --------------------------------------------------------  IN: 240 mL / OUT: 300 mL / NET: -60 mL        PHYSICAL EXAM:  Vital Signs Last 24 Hrs  T(C): 36.2 (14 Feb 2021 21:21), Max: 36.9 (14 Feb 2021 14:10)  T(F): 97.2 (14 Feb 2021 21:21), Max: 98.4 (14 Feb 2021 14:10)  HR: 66 (14 Feb 2021 21:21) (66 - 67)  BP: 112/68 (14 Feb 2021 21:21) (112/68 - 113/64)  BP(mean): --  RR: 17 (14 Feb 2021 21:21) (17 - 17)  SpO2: 97% (14 Feb 2021 21:21) (94% - 97%)    GENERAL: NAD, elderly female  HEAD: +Large fungating malodorous soft tissue mass localized to left cheek with brownish serous discharge, smaller in size from admission, doesn't cross midline, +non-tender, otherwise normocephalic and normal facies  EYES: EOMI, PERRLA, conjunctiva and sclera clear  ENMT: No tonsillar erythema, exudates, or enlargement; Moist mucous membranes  NECK: Supple, No JVD, Normal Thyroid  CHEST/LUNG: Decreased lung sounds on left lower lobe, otherwise clear to auscultation bilaterally; No rales, rhonchi, wheezing, or rubs  HEART: Regular rate and rhythm; S1 and S2; No murmurs, rubs, or gallops  ABDOMEN: +Splenomegaly. Soft, Nontender, Nondistended: Bowel sounds present  EXTREMITIES: Palpable Peripheral Pulses, No clubbing, cyanosis, or edema  LYMPH: +Left axillary LAD  SKIN: +Left shin necrotic lesion covered in bandaging C/d/i, left wrist soft tissue covered in bandaging c/d/i, non TTP  PSYCH: AOx4, affect appropriate    LABS:                        10.2   101.20 )-----------( 189      ( 13 Feb 2021 07:43 )             35.4     02-13    140  |  104  |  12  ----------------------------<  82  3.9   |  21<L>  |  0.92    Ca    9.1      13 Feb 2021 07:50  Phos  3.2     02-13  Mg     2.4     02-13                  RADIOLOGY & ADDITIONAL TESTS:  Results Reviewed:   Imaging Personally Reviewed:  Electrocardiogram Personally Reviewed:    COORDINATION OF CARE:  Care Discussed with Consultants/Other Providers [Y/N]:  Prior or Outpatient Records Reviewed [Y/N]:

## 2021-02-15 NOTE — PROGRESS NOTE ADULT - PROBLEM SELECTOR PLAN 5
Notable uterine and bladder prolapse on exam and CT Scan.  -UA positive for bacteria and LE, urinary frequency  -Patient refused GYN eval inpatient  -GYN consult-outpatient follow-up

## 2021-02-16 ENCOUNTER — TRANSCRIPTION ENCOUNTER (OUTPATIENT)
Age: 86
End: 2021-02-16

## 2021-02-16 VITALS
HEART RATE: 68 BPM | DIASTOLIC BLOOD PRESSURE: 61 MMHG | RESPIRATION RATE: 17 BRPM | SYSTOLIC BLOOD PRESSURE: 108 MMHG | OXYGEN SATURATION: 97 % | TEMPERATURE: 98 F

## 2021-02-16 PROCEDURE — 99239 HOSP IP/OBS DSCHRG MGMT >30: CPT

## 2021-02-16 RX ADMIN — Medication 1 APPLICATION(S): at 12:57

## 2021-02-16 RX ADMIN — ENOXAPARIN SODIUM 30 MILLIGRAM(S): 100 INJECTION SUBCUTANEOUS at 12:58

## 2021-02-16 NOTE — PROGRESS NOTE ADULT - REASON FOR ADMISSION
worsening swelling of Left face

## 2021-02-16 NOTE — PROGRESS NOTE ADULT - PROBLEM SELECTOR PROBLEM 4
Bacteria in urine

## 2021-02-16 NOTE — PROGRESS NOTE ADULT - ATTENDING COMMENTS
pt seen and examined, agree w above. pt w underlying cd5+ B cell LPD. pts with LPD at risk for secondary malignancies. needs biopsy of fungating mass. pt refusing "will think about it". 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 Informed patient of diagnosis. She is not interested at this moment in doing a biopsy or further work-up of the mass (unknown primary cancer) even if this could bring symptomatic relief.
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 High suspicion for malignancy.  Patient had expectations to have facial mass resected - s/p Surg Onc consult . Patient still mulling over resection vs conservative treatment.  Appreciate heme input - CLL vs reactive.  Both service requesting for IR guided Bx for identification of the nature of the mass - however patient is still mulling over the decision.
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 High suspicion for malignancy.  s/p Surg Onc consult - Patient declining surgical resection. 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  Patient favoring conservative management. Patient medically optimized for discharge.  Discharge planning 40 minutes - discussed with patient and consultants. However disposition complicated because daughter she's living with is unable to care for her.  Other daughter in [place] is estranged from the patient and would not be involved in post-hospital care.  Will inquire Hospice about home hospice.
Patient is a 89 year old woman w/ no past medical history coming w/ 3 years of fungating mass to L cheek, L wrist, L shin, abdominal mass. CTMF- L face 7x 4.6x 9.3cm mass w/ gas. Surg Onc - patient refusing surgery. Heme -cutaneous marginal zone lymphoma, refusing further w/u. Patient pending hospice. Has full capacity. No more daily labs. Patient does not want to discuss code status further. Patient course complicated by UTI s/p 3 day course of ceftriaxone.  - pending hospice
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 High suspicion for malignancy. Surg Onc consult - Patient declining surgical resection; local wound care. 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  Patient favoring conservative management. Patient medically optimized for discharge.  Discharge planning 40 minutes - discussed with patient and consultants. However disposition complicated because daughter she's living with is unable to care for her.  Other daughter in [place] is estranged from the patient and would not be involved in post-hospital care.  Will inquire Hospice about home hospice for safe disposition.
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 Pt refusing further workup as this time, only wants to know when she is going home with hospice (now agreeable). C/w local wound care, pain control. Pending home hospice services to be set up.
Patient is a 89 year old woman w/ no past medical history coming w/ 3 years of fungating mass to L cheek, L wrist, L shin, abdominal mass. CTMF- L face 7x 4.6x 9.3cm mass w/ gas. Surg Onc - patient refusing surgery. Heme -cutaneous marginal zone lymphoma, refusing further w/u. Patient pending hospice. Has full capacity. No more daily labs. Patietn does not want to discuss code status further. Patient course complicated by UTI s/p 3 day course of ceftriaxone.   - Patient pending hospice
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 Pt refusing further workup as this time. C/w local wound care, pain control.     D/c home with hospice today  35 minutes spent on d/c planning
89F with fungating mass concern for cutaneous marginal zone lymphoma. High suspicion for malignancy. Surg Onc consult - Patient declining surgical resection; local wound care. 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  Patient favoring conservative management. Patient medically optimized for discharge.  Discharge planning 40 minutes - discussed with patient and consultants. However disposition complicated because daughter she's living with is unable to care for her.  Other daughter in [place] is estranged from the patient and would not be involved in post-hospital care.  Will inquire Hospice about home hospice for safe disposition.
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 High suspicion for malignancy.  Patient had expectations to have facial mass resected - s/p Surg Onc consult . Patient declining surgical resection. Appreciate heme input - CLL vs reactive.  Will inquire about any further heme management inpt vs outpt.
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 High suspicion for malignancy.  s/p Surg Onc consult - Patient declining surgical resection. Appreciate heme input - CLL vs reactive.  Patient favoring conservative management. Patient medically optimized for discharge.  Discharge planning 40 minutes - discussed with patient and consultants. However disposition complicated because daughter she's living with is unable to care for her.  Other daughter in [place] is estranged from the patient and would not be involved in post-hospital care.  Will continue discussion with SW/CM about safe disposition - pursuing [facility]/LTC option however patient is refusing.
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 High suspicion for malignancy.  s/p Surg Onc consult - Patient declining surgical resection. Appreciate heme input - CLL vs reactive.  Patient favoring conservative management. Anticipate discharge back to home with optional F/U with heme and/or surg onc. Patient medically optimized for discharge.  Discharge planning 40 minutes - discussed with patient and consultants.

## 2021-02-16 NOTE — DISCHARGE NOTE NURSING/CASE MANAGEMENT/SOCIAL WORK - PATIENT PORTAL LINK FT
You can access the FollowMyHealth Patient Portal offered by [facility] by registering at the following website: [email or web address]. By joining [employer]’s FollowMyHealth portal, you will also be able to view your health information using other applications (apps) compatible with our system.

## 2021-02-16 NOTE — DISCHARGE NOTE NURSING/CASE MANAGEMENT/SOCIAL WORK - NSDCCRNAME_GEN_ALL_CORE_FT
PT NAME:  SHAUNA RAYGOZA   [id number]    DATE AND TIME OF SERVICE:  2/16/2021  500PM PK UP       VENDOR NAME & CONTACT #:  [facility] [phone]    TRIP #:  [id number]    CONFIRMED BY:  MELISA

## 2021-02-16 NOTE — PROGRESS NOTE ADULT - PROVIDER SPECIALTY LIST ADULT
Heme/Onc
Internal Medicine

## 2021-02-16 NOTE — PROGRESS NOTE ADULT - ASSESSMENT
89F no pmh presents to ED for a left cheek fungating mass, found to have leukocytosis and hematology consulted for eval:     #leukocytosis  -ANC and ALC both elevated.   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 recommend skin biopsy for further assessment. patient refusing further w/u at this time.   -in addition, leukocytosis could also be reactive in setting of malignancy, skin lesions as well   -trend CBC with diff daily, and daily TLS labs: cmp, uric acid, ldh.  -patient can f/u at [facility] if she would like to be evaluated for further w/u and treatment.      Dorian Wyman Heme/onc PGY4 
Patient is a 89 y F with no PMHx, not seen by a physician for over 10 years, who presents for increasing growth of a fungating necrotic mass of the left face for the past 3 months. 

## 2021-02-16 NOTE — PROGRESS NOTE ADULT - PROBLEM SELECTOR PROBLEM 1
Facial mass

## 2021-02-16 NOTE — HOSPICE CARE NOTE - CONVESATION DETAILS
HCN RN contacted pt to review HCN services as directed. Pt did not want to speak to HCN RN--just requested to go home and hung up the phone. RN spoke with Dtr Aviva who requested a return phone call after 4pm today. 
RN spoke with pts 2 dtrs Tania and Aviva on a phone conference. Due to pts strong desire to go home, dtrs are requesting that HCN follow up in the community after pt is discharge. Pls call dtr Tania to arrange discharge. HCN consents were emailed to both dtrs for review. 
Tues 2/16: [facility]: Hospice consents were discussed and received from patient's daughter Aviva today as patient deferred to her. KELLY Mccray made aware and stated that patient will be discharged home at 5 pm today. 02 2L prn ordered for delivery today as well, Josh Paniagua RN.

## 2021-02-16 NOTE — PROGRESS NOTE ADULT - PROBLEM SELECTOR PROBLEM 5
Uterine prolapse

## 2021-02-16 NOTE — PROGRESS NOTE ADULT - PROBLEM SELECTOR PROBLEM 3
Necrotic eschar

## 2021-02-16 NOTE — PROGRESS NOTE ADULT - SUBJECTIVE AND OBJECTIVE BOX
Nereida Robles, PGY-1  Internal Medicine  Pager: [phone]/[place] [phone]    PROGRESS NOTE:     Patient is a 89y old  Female who presents with a chief complaint of worsening swelling of Left face (15 Feb 2021 06:36)      SUBJECTIVE / OVERNIGHT EVENTS:    ADDITIONAL REVIEW OF SYSTEMS:    MEDICATIONS  (STANDING):  Dakins Solution - 1/4 Strength 1 Application(s) Topical daily  enoxaparin Injectable 30 milliGRAM(s) SubCutaneous daily  influenza   Vaccine 0.5 milliLiter(s) IntraMuscular once    MEDICATIONS  (PRN):      CAPILLARY BLOOD GLUCOSE        I&O's Summary    15 Feb 2021 07:01  -  16 Feb 2021 07:00  --------------------------------------------------------  IN: 150 mL / OUT: 450 mL / NET: -300 mL        PHYSICAL EXAM:  Vital Signs Last 24 Hrs  T(C): 36.6 (15 Feb 2021 21:03), Max: 36.6 (15 Feb 2021 21:03)  T(F): 97.9 (15 Feb 2021 21:03), Max: 97.9 (15 Feb 2021 21:03)  HR: 66 (15 Feb 2021 21:03) (66 - 66)  BP: 114/60 (15 Feb 2021 21:03) (107/60 - 114/60)  BP(mean): --  RR: 16 (15 Feb 2021 21:03) (16 - 17)  SpO2: 100% (15 Feb 2021 21:03) (100% - 100%)    CONSTITUTIONAL: NAD, well-developed  RESPIRATORY: Normal respiratory effort; lungs are clear to auscultation bilaterally  CARDIOVASCULAR: Regular rate and rhythm, normal S1 and S2, no murmur/rub/gallop; No lower extremity edema; Peripheral pulses are 2+ bilaterally  ABDOMEN: Nontender to palpation, normoactive bowel sounds, no rebound/guarding; No hepatosplenomegaly  MUSCLOSKELETAL: no clubbing or cyanosis of digits; no joint swelling or tenderness to palpation  PSYCH: A+O to person, place, and time; affect appropriate    LABS:                      RADIOLOGY & ADDITIONAL TESTS:  Results Reviewed:   Imaging Personally Reviewed:  Electrocardiogram Personally Reviewed:    COORDINATION OF CARE:  Care Discussed with Consultants/Other Providers [Y/N]:  Prior or Outpatient Records Reviewed [Y/N]:   Nereida Robles, PGY-1  Internal Medicine  Pager: [phone]/[facility] [phone]    PROGRESS NOTE:     Patient is a 89y old  Female who presents with a chief complaint of worsening swelling of Left face (15 Feb 2021 06:36)      SUBJECTIVE / OVERNIGHT EVENTS: No acute events overnight. Pt seen and examined at bedside. Denies fever, chills, nausea, vomiting, cp, or sob.     ADDITIONAL REVIEW OF SYSTEMS: No facial pain, numbness or paralysis. No SOB, vision changes, dysphagia.     MEDICATIONS  (STANDING):  Dakins Solution - 1/4 Strength 1 Application(s) Topical daily  enoxaparin Injectable 30 milliGRAM(s) SubCutaneous daily  influenza   Vaccine 0.5 milliLiter(s) IntraMuscular once    MEDICATIONS  (PRN):      CAPILLARY BLOOD GLUCOSE        I&O's Summary    15 Feb 2021 07:01  -  16 Feb 2021 07:00  --------------------------------------------------------  IN: 150 mL / OUT: 450 mL / NET: -300 mL        PHYSICAL EXAM:  Vital Signs Last 24 Hrs  T(C): 36.6 (15 Feb 2021 21:03), Max: 36.6 (15 Feb 2021 21:03)  T(F): 97.9 (15 Feb 2021 21:03), Max: 97.9 (15 Feb 2021 21:03)  HR: 66 (15 Feb 2021 21:03) (66 - 66)  BP: 114/60 (15 Feb 2021 21:03) (107/60 - 114/60)  BP(mean): --  RR: 16 (15 Feb 2021 21:03) (16 - 17)  SpO2: 100% (15 Feb 2021 21:03) (100% - 100%)    GENERAL: NAD, elderly female  HEAD: +Large fungating malodorous soft tissue mass localized to left cheek with brownish serous discharge, smaller in size from admission, doesn't cross midline, +non-tender, otherwise normocephalic and normal facies  EYES: EOMI, PERRLA, conjunctiva and sclera clear  ENMT: No tonsillar erythema, exudates, or enlargement; Moist mucous membranes  NECK: Supple, No JVD, Normal Thyroid  CHEST/LUNG: Decreased lung sounds on left lower lobe, otherwise clear to auscultation bilaterally; No rales, rhonchi, wheezing, or rubs  HEART: Regular rate and rhythm; S1 and S2; No murmurs, rubs, or gallops  ABDOMEN: +Splenomegaly. Soft, Nontender, Nondistended: Bowel sounds present  EXTREMITIES: Palpable Peripheral Pulses, No clubbing, cyanosis, or edema  LYMPH: +Left axillary LAD  SKIN: +Left shin necrotic lesion covered in bandaging C/d/i, left wrist soft tissue covered in bandaging c/d/i, non TTP  PSYCH: AOx4, affect appropriate    LABS:                      RADIOLOGY & ADDITIONAL TESTS:  Results Reviewed:   Imaging Personally Reviewed:  Electrocardiogram Personally Reviewed:    COORDINATION OF CARE:  Care Discussed with Consultants/Other Providers [Y/N]:  Prior or Outpatient Records Reviewed [Y/N]:

## 2021-02-16 NOTE — DISCHARGE NOTE NURSING/CASE MANAGEMENT/SOCIAL WORK - NSDCFUADDAPPT_GEN_ALL_CORE_FT
[facility]   [address] [address]  [phone]   Follow-Up Time: 1 week

## 2022-02-04 NOTE — DIETITIAN INITIAL EVALUATION ADULT. - PROBLEM SELECTOR PLAN 4
Wants a 90 day supply instead of 30
UA positive for bacteria with LE  -Denying dysuria, hematuria  -Has urinary frequency likely 2/2 to gross bladder and uterine prolapse  -CTM off abx  -Monitor for increasing temp

## 2023-05-16 NOTE — PROGRESS NOTE ADULT - PROBLEM SELECTOR PLAN 1
-F/u Hospice care referral  -Large fungating necrotic soft tissue mass on left face containing droplets of gas.  -CT HEAD showing no intracranial hemorrhage, edema or mass effect  -CT MAXILLOFACIAL BONES showing no associated bony destruction  -DDx malignancy (leukemia, T Cell lymphoma, mycosis fungoides, etc), infectious  -Denies exposure, recent trauma to the face  -Re: MRI and Surgery, pt states that she does not want to work up this mass and does not want biopsy or surgical intervention for it. Reiterated sentiment again. Has full capacity to make this decision.  -patient's daughters aware of patient's decision Skin Substitute Text: The defect edges were debeveled with a #15 scalpel blade.  Given the location of the defect, shape of the defect and the proximity to free margins a skin substitute graft was deemed most appropriate.  The graft material was trimmed to fit the size of the defect. The graft was then placed in the primary defect and oriented appropriately.

## 2023-11-08 NOTE — PROVIDER CONTACT NOTE (OTHER) - BACKGROUND
Ann B Marsh Meigs P [place],M  Nurse Msg Pool (supporting Misa Qureshi MD) 35 minutes ago (3:59 PM)       Hi Dr. Qureshi,  I have a new problem that happened this morning. As I was doing in bed stretches, in particular, a hamstring stretch, I got a cramp in my right calf, and after getting over it I found I had a pain in the back of my right knee, my right calf and somewhat in my right ankle. I've had it all day. I took tylenol and both iced it and applied a heating pad. I did read that joint pain is a possible covid symptom. I put an elastic bandage on the knee to support it. It's quite painful. Do you think this is an orthopedic problem or a medical problem due to covid? As far as covid goes I have tested negative for two weeks and although still am coughing, it is not so bad. The tessalon perles do help. If it's a covid thing, ok I'll just have to wait till it goes away I suppose, but I was worried that it might be something unrelated.     
Onset/Background: Pt c/o left lower leg pain onset this morning while stretching out in bed. Pt states her leg aches from the back of her knee all the way down. Pt states her leg feels painful and weak. Onset of pain 0700 this morning.     Denies: leg injury, chest pain, shortness of breath, difficulty breathing, fever, redness of any kind, swelling of any kind, vomiting, headaches, nausea, lightheadedness, dizziness, changes in vision, changes in diet, changes with appetite, limited fluid intake, bleeding of any kind, fatigue, weakness,    Per protocol pt should be seen within Emergency Department.  Pt given care advice and reasons to call back per protocol.  No further questions.      Routing to PCP for FYI.    Reason for Disposition  • Thigh or calf pain in only one leg and present > 1 hour    Protocols used: LEG PAIN-A-OH    
Patient is a 89 yr old female admitted with facial swelling/ mass.
89 y/o female admitted for fungating mass to left cheek, mass to left forearm and wound to left shin

## 2024-07-30 NOTE — PHYSICAL THERAPY INITIAL EVALUATION ADULT - MANUAL MUSCLE TESTING RESULTS, REHAB EVAL
Patient Education        Walking for Exercise: Care Instructions  Overview     Walking is one of the easiest ways to get the exercise you need for good health. A brisk, 30-minute walk each day can help you feel better and have more energy. It can help you lower your risk of disease. Walking can help you keep your bones strong and your heart healthy.  Check with your doctor before you start a walking plan if you have heart problems, other health issues, or you have not been active in a long time. Follow your doctor's instructions for safe levels of exercise.  Follow-up care is a key part of your treatment and safety. Be sure to make and go to all appointments, and call your doctor if you are having problems. It's also a good idea to know your test results and keep a list of the medicines you take.  How can you care for yourself at home?  Getting started  Start slowly and set a short-term goal. For example, walk for 5 or 10 minutes every day.  Bit by bit, increase the amount you walk every day. Try for at least 30 minutes on most days of the week. You also may want to swim, bike, or do other activities.  If finding enough time is a problem, it's fine to be active in shorter periods of time throughout your day.  To get the heart-healthy benefits of walking, you need to walk briskly enough to increase your heart rate and breathing, but not so fast that you can't talk comfortably.  Wear comfortable shoes that fit well and provide good support for your feet and ankles.  Staying with your plan  After you've made walking a habit, set a longer-term goal. You may want to set a goal of walking briskly for longer or walking farther. Experts say to do 2½ hours (150 minutes) of moderate activity a week. A faster heartbeat is what defines moderate-level activity.  To stay motivated, walk with friends, coworkers, or pets.  Use a phone rodrigo, pedometer, or wearable device to track your steps each day. Set a goal to increase your 
bilateral UE and LE 3-/5